# Patient Record
Sex: FEMALE | Race: WHITE | Employment: FULL TIME
[De-identification: names, ages, dates, MRNs, and addresses within clinical notes are randomized per-mention and may not be internally consistent; named-entity substitution may affect disease eponyms.]

---

## 2017-06-03 ENCOUNTER — HEALTH MAINTENANCE LETTER (OUTPATIENT)
Age: 57
End: 2017-06-03

## 2018-10-29 RX ORDER — MULTIPLE VITAMINS W/ MINERALS TAB 9MG-400MCG
1 TAB ORAL DAILY
COMMUNITY

## 2018-11-03 ENCOUNTER — ANESTHESIA EVENT (OUTPATIENT)
Dept: SURGERY | Facility: CLINIC | Age: 58
DRG: 455 | End: 2018-11-03
Payer: COMMERCIAL

## 2018-11-05 ENCOUNTER — APPOINTMENT (OUTPATIENT)
Dept: GENERAL RADIOLOGY | Facility: CLINIC | Age: 58
DRG: 455 | End: 2018-11-05
Attending: ORTHOPAEDIC SURGERY
Payer: COMMERCIAL

## 2018-11-05 ENCOUNTER — ANESTHESIA (OUTPATIENT)
Dept: SURGERY | Facility: CLINIC | Age: 58
DRG: 455 | End: 2018-11-05
Payer: COMMERCIAL

## 2018-11-05 ENCOUNTER — HOSPITAL ENCOUNTER (INPATIENT)
Facility: CLINIC | Age: 58
LOS: 3 days | Discharge: HOME | DRG: 455 | End: 2018-11-08
Attending: ORTHOPAEDIC SURGERY | Admitting: ORTHOPAEDIC SURGERY
Payer: COMMERCIAL

## 2018-11-05 ENCOUNTER — SURGERY (OUTPATIENT)
Age: 58
End: 2018-11-05

## 2018-11-05 DIAGNOSIS — M48.062 SPINAL STENOSIS OF LUMBAR REGION WITH NEUROGENIC CLAUDICATION: Primary | ICD-10-CM

## 2018-11-05 PROBLEM — M43.10: Status: ACTIVE | Noted: 2018-11-05

## 2018-11-05 LAB
ABO + RH BLD: NORMAL
ABO + RH BLD: NORMAL
BLD GP AB SCN SERPL QL: NORMAL
BLOOD BANK CMNT PATIENT-IMP: NORMAL
DEPRECATED CALCIDIOL+CALCIFEROL SERPL-MC: 76 UG/L (ref 20–75)
SODIUM SERPL-SCNC: 137 MMOL/L (ref 133–144)
SPECIMEN EXP DATE BLD: NORMAL

## 2018-11-05 PROCEDURE — 86850 RBC ANTIBODY SCREEN: CPT | Performed by: ANESTHESIOLOGY

## 2018-11-05 PROCEDURE — 27211024 ZZHC OR SUPPLY OTHER OPNP: Performed by: ORTHOPAEDIC SURGERY

## 2018-11-05 PROCEDURE — 84295 ASSAY OF SERUM SODIUM: CPT | Performed by: ORTHOPAEDIC SURGERY

## 2018-11-05 PROCEDURE — 25000128 H RX IP 250 OP 636: Performed by: PHYSICIAN ASSISTANT

## 2018-11-05 PROCEDURE — C1713 ANCHOR/SCREW BN/BN,TIS/BN: HCPCS | Performed by: ORTHOPAEDIC SURGERY

## 2018-11-05 PROCEDURE — 0SG0071 FUSION OF LUMBAR VERTEBRAL JOINT WITH AUTOLOGOUS TISSUE SUBSTITUTE, POSTERIOR APPROACH, POSTERIOR COLUMN, OPEN APPROACH: ICD-10-PCS | Performed by: ORTHOPAEDIC SURGERY

## 2018-11-05 PROCEDURE — 40000985 XR LUMBAR SPINE PORT 1 VW

## 2018-11-05 PROCEDURE — 25000128 H RX IP 250 OP 636: Performed by: ORTHOPAEDIC SURGERY

## 2018-11-05 PROCEDURE — 25000128 H RX IP 250 OP 636: Performed by: NURSE ANESTHETIST, CERTIFIED REGISTERED

## 2018-11-05 PROCEDURE — 25000128 H RX IP 250 OP 636: Performed by: ANESTHESIOLOGY

## 2018-11-05 PROCEDURE — 25000125 ZZHC RX 250: Performed by: NURSE ANESTHETIST, CERTIFIED REGISTERED

## 2018-11-05 PROCEDURE — 40000985 XR LUMBAR SPINE PORT 2-3VW

## 2018-11-05 PROCEDURE — P9041 ALBUMIN (HUMAN),5%, 50ML: HCPCS | Performed by: NURSE ANESTHETIST, CERTIFIED REGISTERED

## 2018-11-05 PROCEDURE — 36415 COLL VENOUS BLD VENIPUNCTURE: CPT | Performed by: ORTHOPAEDIC SURGERY

## 2018-11-05 PROCEDURE — 27810325 ZZHC OR IMPLANT OTHER OPNP: Performed by: ORTHOPAEDIC SURGERY

## 2018-11-05 PROCEDURE — 37000009 ZZH ANESTHESIA TECHNICAL FEE, EACH ADDTL 15 MIN: Performed by: ORTHOPAEDIC SURGERY

## 2018-11-05 PROCEDURE — 25000301 ZZH OR RX SURGIFLO W/THROMBIN KIT 2ML 1991 OPNP: Performed by: ORTHOPAEDIC SURGERY

## 2018-11-05 PROCEDURE — 27210794 ZZH OR GENERAL SUPPLY STERILE: Performed by: ORTHOPAEDIC SURGERY

## 2018-11-05 PROCEDURE — 12000000 ZZH R&B MED SURG/OB

## 2018-11-05 PROCEDURE — 37000008 ZZH ANESTHESIA TECHNICAL FEE, 1ST 30 MIN: Performed by: ORTHOPAEDIC SURGERY

## 2018-11-05 PROCEDURE — 0SG00AJ FUSION OF LUMBAR VERTEBRAL JOINT WITH INTERBODY FUSION DEVICE, POSTERIOR APPROACH, ANTERIOR COLUMN, OPEN APPROACH: ICD-10-PCS | Performed by: ORTHOPAEDIC SURGERY

## 2018-11-05 PROCEDURE — 71000013 ZZH RECOVERY PHASE 1 LEVEL 1 EA ADDTL HR: Performed by: ORTHOPAEDIC SURGERY

## 2018-11-05 PROCEDURE — 25000125 ZZHC RX 250: Performed by: ANESTHESIOLOGY

## 2018-11-05 PROCEDURE — 82306 VITAMIN D 25 HYDROXY: CPT | Performed by: ORTHOPAEDIC SURGERY

## 2018-11-05 PROCEDURE — 25000132 ZZH RX MED GY IP 250 OP 250 PS 637: Performed by: PHYSICIAN ASSISTANT

## 2018-11-05 PROCEDURE — 36000069 ZZH SURGERY LEVEL 5 EA 15 ADDTL MIN: Performed by: ORTHOPAEDIC SURGERY

## 2018-11-05 PROCEDURE — 36000071 ZZH SURGERY LEVEL 5 W FLUORO 1ST 30 MIN: Performed by: ORTHOPAEDIC SURGERY

## 2018-11-05 PROCEDURE — 25000566 ZZH SEVOFLURANE, EA 15 MIN: Performed by: ORTHOPAEDIC SURGERY

## 2018-11-05 PROCEDURE — 86900 BLOOD TYPING SEROLOGIC ABO: CPT | Performed by: ANESTHESIOLOGY

## 2018-11-05 PROCEDURE — 86901 BLOOD TYPING SEROLOGIC RH(D): CPT | Performed by: ANESTHESIOLOGY

## 2018-11-05 PROCEDURE — 0ST20ZZ RESECTION OF LUMBAR VERTEBRAL DISC, OPEN APPROACH: ICD-10-PCS | Performed by: ORTHOPAEDIC SURGERY

## 2018-11-05 PROCEDURE — 25000125 ZZHC RX 250: Performed by: ORTHOPAEDIC SURGERY

## 2018-11-05 PROCEDURE — 36415 COLL VENOUS BLD VENIPUNCTURE: CPT | Performed by: ANESTHESIOLOGY

## 2018-11-05 PROCEDURE — 27810322 ZZHC OR SPINE - CAGE/SPACER/DISK/CORD/CONNECTOR OPNP: Performed by: ORTHOPAEDIC SURGERY

## 2018-11-05 PROCEDURE — 25000132 ZZH RX MED GY IP 250 OP 250 PS 637: Performed by: ANESTHESIOLOGY

## 2018-11-05 PROCEDURE — 40000170 ZZH STATISTIC PRE-PROCEDURE ASSESSMENT II: Performed by: ORTHOPAEDIC SURGERY

## 2018-11-05 PROCEDURE — 71000012 ZZH RECOVERY PHASE 1 LEVEL 1 FIRST HR: Performed by: ORTHOPAEDIC SURGERY

## 2018-11-05 DEVICE — IMP SCR NUT PEDICLE 8X8 LOCKING: Type: IMPLANTABLE DEVICE | Site: SPINE LUMBAR | Status: FUNCTIONAL

## 2018-11-05 DEVICE — IMPLANTABLE DEVICE: Type: IMPLANTABLE DEVICE | Site: SPINE LUMBAR | Status: FUNCTIONAL

## 2018-11-05 DEVICE — IMP SCR SYN POLY 6.5X50MM: Type: IMPLANTABLE DEVICE | Site: SPINE LUMBAR | Status: FUNCTIONAL

## 2018-11-05 RX ORDER — ONDANSETRON 4 MG/1
4 TABLET, ORALLY DISINTEGRATING ORAL EVERY 6 HOURS PRN
Status: DISCONTINUED | OUTPATIENT
Start: 2018-11-05 | End: 2018-11-08 | Stop reason: HOSPADM

## 2018-11-05 RX ORDER — GABAPENTIN 300 MG/1
300 CAPSULE ORAL ONCE
Status: COMPLETED | OUTPATIENT
Start: 2018-11-05 | End: 2018-11-05

## 2018-11-05 RX ORDER — MULTIPLE VITAMINS W/ MINERALS TAB 9MG-400MCG
1 TAB ORAL DAILY
Status: DISCONTINUED | OUTPATIENT
Start: 2018-11-06 | End: 2018-11-08 | Stop reason: HOSPADM

## 2018-11-05 RX ORDER — HYDROMORPHONE HYDROCHLORIDE 1 MG/ML
.3-.5 INJECTION, SOLUTION INTRAMUSCULAR; INTRAVENOUS; SUBCUTANEOUS
Status: DISCONTINUED | OUTPATIENT
Start: 2018-11-05 | End: 2018-11-08 | Stop reason: HOSPADM

## 2018-11-05 RX ORDER — NEOSTIGMINE METHYLSULFATE 1 MG/ML
VIAL (ML) INJECTION PRN
Status: DISCONTINUED | OUTPATIENT
Start: 2018-11-05 | End: 2018-11-05

## 2018-11-05 RX ORDER — HYDROXYZINE HYDROCHLORIDE 50 MG/ML
25 INJECTION, SOLUTION INTRAMUSCULAR ONCE
Status: COMPLETED | OUTPATIENT
Start: 2018-11-05 | End: 2018-11-05

## 2018-11-05 RX ORDER — FENTANYL CITRATE 50 UG/ML
INJECTION, SOLUTION INTRAMUSCULAR; INTRAVENOUS PRN
Status: DISCONTINUED | OUTPATIENT
Start: 2018-11-05 | End: 2018-11-05

## 2018-11-05 RX ORDER — SODIUM CHLORIDE, SODIUM LACTATE, POTASSIUM CHLORIDE, CALCIUM CHLORIDE 600; 310; 30; 20 MG/100ML; MG/100ML; MG/100ML; MG/100ML
INJECTION, SOLUTION INTRAVENOUS CONTINUOUS
Status: DISCONTINUED | OUTPATIENT
Start: 2018-11-05 | End: 2018-11-05 | Stop reason: HOSPADM

## 2018-11-05 RX ORDER — ONDANSETRON 2 MG/ML
4 INJECTION INTRAMUSCULAR; INTRAVENOUS EVERY 30 MIN PRN
Status: DISCONTINUED | OUTPATIENT
Start: 2018-11-05 | End: 2018-11-05 | Stop reason: HOSPADM

## 2018-11-05 RX ORDER — METOCLOPRAMIDE 10 MG/1
10 TABLET ORAL EVERY 6 HOURS PRN
Status: DISCONTINUED | OUTPATIENT
Start: 2018-11-05 | End: 2018-11-08 | Stop reason: HOSPADM

## 2018-11-05 RX ORDER — GLYCOPYRROLATE 0.2 MG/ML
INJECTION, SOLUTION INTRAMUSCULAR; INTRAVENOUS PRN
Status: DISCONTINUED | OUTPATIENT
Start: 2018-11-05 | End: 2018-11-05

## 2018-11-05 RX ORDER — ONDANSETRON 2 MG/ML
INJECTION INTRAMUSCULAR; INTRAVENOUS PRN
Status: DISCONTINUED | OUTPATIENT
Start: 2018-11-05 | End: 2018-11-05

## 2018-11-05 RX ORDER — LIDOCAINE HYDROCHLORIDE 20 MG/ML
INJECTION, SOLUTION INFILTRATION; PERINEURAL PRN
Status: DISCONTINUED | OUTPATIENT
Start: 2018-11-05 | End: 2018-11-05

## 2018-11-05 RX ORDER — SODIUM CHLORIDE, SODIUM LACTATE, POTASSIUM CHLORIDE, CALCIUM CHLORIDE 600; 310; 30; 20 MG/100ML; MG/100ML; MG/100ML; MG/100ML
INJECTION, SOLUTION INTRAVENOUS CONTINUOUS PRN
Status: DISCONTINUED | OUTPATIENT
Start: 2018-11-05 | End: 2018-11-05

## 2018-11-05 RX ORDER — HYDROMORPHONE HYDROCHLORIDE 1 MG/ML
.3-.5 INJECTION, SOLUTION INTRAMUSCULAR; INTRAVENOUS; SUBCUTANEOUS EVERY 5 MIN PRN
Status: DISCONTINUED | OUTPATIENT
Start: 2018-11-05 | End: 2018-11-05 | Stop reason: HOSPADM

## 2018-11-05 RX ORDER — OXYCODONE HCL 10 MG/1
10 TABLET, FILM COATED, EXTENDED RELEASE ORAL ONCE
Status: COMPLETED | OUTPATIENT
Start: 2018-11-05 | End: 2018-11-05

## 2018-11-05 RX ORDER — OXYCODONE AND ACETAMINOPHEN 5; 325 MG/1; MG/1
1-2 TABLET ORAL EVERY 4 HOURS PRN
Status: DISCONTINUED | OUTPATIENT
Start: 2018-11-05 | End: 2018-11-08 | Stop reason: HOSPADM

## 2018-11-05 RX ORDER — AMOXICILLIN 250 MG
2 CAPSULE ORAL 2 TIMES DAILY
Status: DISCONTINUED | OUTPATIENT
Start: 2018-11-05 | End: 2018-11-08 | Stop reason: HOSPADM

## 2018-11-05 RX ORDER — METOCLOPRAMIDE HYDROCHLORIDE 5 MG/ML
10 INJECTION INTRAMUSCULAR; INTRAVENOUS EVERY 6 HOURS PRN
Status: DISCONTINUED | OUTPATIENT
Start: 2018-11-05 | End: 2018-11-08 | Stop reason: HOSPADM

## 2018-11-05 RX ORDER — EPHEDRINE SULFATE 50 MG/ML
INJECTION, SOLUTION INTRAMUSCULAR; INTRAVENOUS; SUBCUTANEOUS PRN
Status: DISCONTINUED | OUTPATIENT
Start: 2018-11-05 | End: 2018-11-05

## 2018-11-05 RX ORDER — ALBUMIN, HUMAN INJ 5% 5 %
SOLUTION INTRAVENOUS CONTINUOUS PRN
Status: DISCONTINUED | OUTPATIENT
Start: 2018-11-05 | End: 2018-11-05

## 2018-11-05 RX ORDER — NALOXONE HYDROCHLORIDE 0.4 MG/ML
.1-.4 INJECTION, SOLUTION INTRAMUSCULAR; INTRAVENOUS; SUBCUTANEOUS
Status: ACTIVE | OUTPATIENT
Start: 2018-11-05 | End: 2018-11-06

## 2018-11-05 RX ORDER — LORAZEPAM 2 MG/ML
.5-1 INJECTION INTRAMUSCULAR EVERY 6 HOURS PRN
Status: DISCONTINUED | OUTPATIENT
Start: 2018-11-05 | End: 2018-11-08 | Stop reason: HOSPADM

## 2018-11-05 RX ORDER — AMOXICILLIN 250 MG
1 CAPSULE ORAL 2 TIMES DAILY
Status: DISCONTINUED | OUTPATIENT
Start: 2018-11-05 | End: 2018-11-08 | Stop reason: HOSPADM

## 2018-11-05 RX ORDER — ATORVASTATIN CALCIUM 10 MG/1
10 TABLET, FILM COATED ORAL AT BEDTIME
Status: DISCONTINUED | OUTPATIENT
Start: 2018-11-05 | End: 2018-11-08 | Stop reason: HOSPADM

## 2018-11-05 RX ORDER — HYDRALAZINE HYDROCHLORIDE 20 MG/ML
2.5-5 INJECTION INTRAMUSCULAR; INTRAVENOUS EVERY 10 MIN PRN
Status: DISCONTINUED | OUTPATIENT
Start: 2018-11-05 | End: 2018-11-05 | Stop reason: HOSPADM

## 2018-11-05 RX ORDER — CEFAZOLIN SODIUM 1 G/3ML
1 INJECTION, POWDER, FOR SOLUTION INTRAMUSCULAR; INTRAVENOUS SEE ADMIN INSTRUCTIONS
Status: DISCONTINUED | OUTPATIENT
Start: 2018-11-05 | End: 2018-11-05 | Stop reason: HOSPADM

## 2018-11-05 RX ORDER — LIDOCAINE 40 MG/G
CREAM TOPICAL
Status: DISCONTINUED | OUTPATIENT
Start: 2018-11-05 | End: 2018-11-08 | Stop reason: HOSPADM

## 2018-11-05 RX ORDER — BISACODYL 10 MG
10 SUPPOSITORY, RECTAL RECTAL DAILY PRN
Status: DISCONTINUED | OUTPATIENT
Start: 2018-11-05 | End: 2018-11-08 | Stop reason: HOSPADM

## 2018-11-05 RX ORDER — ONDANSETRON 4 MG/1
4 TABLET, ORALLY DISINTEGRATING ORAL EVERY 30 MIN PRN
Status: DISCONTINUED | OUTPATIENT
Start: 2018-11-05 | End: 2018-11-05 | Stop reason: HOSPADM

## 2018-11-05 RX ORDER — ALBUTEROL SULFATE 0.83 MG/ML
2.5 SOLUTION RESPIRATORY (INHALATION) EVERY 4 HOURS PRN
Status: DISCONTINUED | OUTPATIENT
Start: 2018-11-05 | End: 2018-11-05 | Stop reason: HOSPADM

## 2018-11-05 RX ORDER — ONDANSETRON 2 MG/ML
4 INJECTION INTRAMUSCULAR; INTRAVENOUS EVERY 6 HOURS PRN
Status: DISCONTINUED | OUTPATIENT
Start: 2018-11-05 | End: 2018-11-08 | Stop reason: HOSPADM

## 2018-11-05 RX ORDER — LORAZEPAM 0.5 MG/1
.5-1 TABLET ORAL EVERY 6 HOURS PRN
Status: DISCONTINUED | OUTPATIENT
Start: 2018-11-05 | End: 2018-11-08 | Stop reason: HOSPADM

## 2018-11-05 RX ORDER — BUPIVACAINE HYDROCHLORIDE AND EPINEPHRINE 2.5; 5 MG/ML; UG/ML
INJECTION, SOLUTION EPIDURAL; INFILTRATION; INTRACAUDAL; PERINEURAL PRN
Status: DISCONTINUED | OUTPATIENT
Start: 2018-11-05 | End: 2018-11-05 | Stop reason: HOSPADM

## 2018-11-05 RX ORDER — ACETAMINOPHEN 500 MG
1000 TABLET ORAL 3 TIMES DAILY PRN
Status: DISCONTINUED | OUTPATIENT
Start: 2018-11-05 | End: 2018-11-08 | Stop reason: HOSPADM

## 2018-11-05 RX ORDER — FENTANYL CITRATE 50 UG/ML
25-50 INJECTION, SOLUTION INTRAMUSCULAR; INTRAVENOUS
Status: DISCONTINUED | OUTPATIENT
Start: 2018-11-05 | End: 2018-11-05 | Stop reason: HOSPADM

## 2018-11-05 RX ORDER — ACETAMINOPHEN 500 MG
1000 TABLET ORAL ONCE
Status: COMPLETED | OUTPATIENT
Start: 2018-11-05 | End: 2018-11-05

## 2018-11-05 RX ORDER — PROPOFOL 10 MG/ML
INJECTION, EMULSION INTRAVENOUS CONTINUOUS PRN
Status: DISCONTINUED | OUTPATIENT
Start: 2018-11-05 | End: 2018-11-05

## 2018-11-05 RX ORDER — CEFAZOLIN SODIUM 2 G/100ML
2 INJECTION, SOLUTION INTRAVENOUS
Status: COMPLETED | OUTPATIENT
Start: 2018-11-05 | End: 2018-11-05

## 2018-11-05 RX ORDER — SODIUM CHLORIDE AND POTASSIUM CHLORIDE 150; 900 MG/100ML; MG/100ML
INJECTION, SOLUTION INTRAVENOUS CONTINUOUS
Status: DISCONTINUED | OUTPATIENT
Start: 2018-11-05 | End: 2018-11-08 | Stop reason: HOSPADM

## 2018-11-05 RX ORDER — NALOXONE HYDROCHLORIDE 0.4 MG/ML
.1-.4 INJECTION, SOLUTION INTRAMUSCULAR; INTRAVENOUS; SUBCUTANEOUS
Status: DISCONTINUED | OUTPATIENT
Start: 2018-11-05 | End: 2018-11-08 | Stop reason: HOSPADM

## 2018-11-05 RX ORDER — CEFAZOLIN SODIUM 1 G/3ML
1 INJECTION, POWDER, FOR SOLUTION INTRAMUSCULAR; INTRAVENOUS EVERY 8 HOURS
Status: COMPLETED | OUTPATIENT
Start: 2018-11-05 | End: 2018-11-06

## 2018-11-05 RX ORDER — PROPOFOL 10 MG/ML
INJECTION, EMULSION INTRAVENOUS PRN
Status: DISCONTINUED | OUTPATIENT
Start: 2018-11-05 | End: 2018-11-05

## 2018-11-05 RX ADMIN — PHENYLEPHRINE HYDROCHLORIDE 50 MCG: 10 INJECTION, SOLUTION INTRAMUSCULAR; INTRAVENOUS; SUBCUTANEOUS at 09:23

## 2018-11-05 RX ADMIN — PHENYLEPHRINE HYDROCHLORIDE 50 MCG: 10 INJECTION, SOLUTION INTRAMUSCULAR; INTRAVENOUS; SUBCUTANEOUS at 08:19

## 2018-11-05 RX ADMIN — FENTANYL CITRATE 50 MCG: 50 INJECTION, SOLUTION INTRAMUSCULAR; INTRAVENOUS at 07:40

## 2018-11-05 RX ADMIN — POTASSIUM CHLORIDE AND SODIUM CHLORIDE: 900; 150 INJECTION, SOLUTION INTRAVENOUS at 16:55

## 2018-11-05 RX ADMIN — OXYCODONE HYDROCHLORIDE 10 MG: 10 TABLET, FILM COATED, EXTENDED RELEASE ORAL at 07:02

## 2018-11-05 RX ADMIN — FENTANYL CITRATE 50 MCG: 50 INJECTION, SOLUTION INTRAMUSCULAR; INTRAVENOUS at 07:39

## 2018-11-05 RX ADMIN — ROCURONIUM BROMIDE 30 MG: 10 INJECTION INTRAVENOUS at 08:19

## 2018-11-05 RX ADMIN — OXYCODONE HYDROCHLORIDE AND ACETAMINOPHEN 2 TABLET: 5; 325 TABLET ORAL at 21:01

## 2018-11-05 RX ADMIN — OXYCODONE HYDROCHLORIDE AND ACETAMINOPHEN 1 TABLET: 5; 325 TABLET ORAL at 16:12

## 2018-11-05 RX ADMIN — PROPOFOL 200 MG: 10 INJECTION, EMULSION INTRAVENOUS at 07:41

## 2018-11-05 RX ADMIN — SODIUM CHLORIDE, POTASSIUM CHLORIDE, SODIUM LACTATE AND CALCIUM CHLORIDE: 600; 310; 30; 20 INJECTION, SOLUTION INTRAVENOUS at 07:15

## 2018-11-05 RX ADMIN — SODIUM CHLORIDE, POTASSIUM CHLORIDE, SODIUM LACTATE AND CALCIUM CHLORIDE: 600; 310; 30; 20 INJECTION, SOLUTION INTRAVENOUS at 06:30

## 2018-11-05 RX ADMIN — FENTANYL CITRATE 50 MCG: 50 INJECTION, SOLUTION INTRAMUSCULAR; INTRAVENOUS at 08:19

## 2018-11-05 RX ADMIN — ACETAMINOPHEN 1000 MG: 500 TABLET, FILM COATED ORAL at 07:02

## 2018-11-05 RX ADMIN — CEFAZOLIN 1 G: 1 INJECTION, POWDER, FOR SOLUTION INTRAMUSCULAR; INTRAVENOUS at 20:09

## 2018-11-05 RX ADMIN — ATORVASTATIN CALCIUM 10 MG: 10 TABLET, FILM COATED ORAL at 21:01

## 2018-11-05 RX ADMIN — Medication 0.5 MG: at 12:11

## 2018-11-05 RX ADMIN — Medication 0.5 MG: at 12:37

## 2018-11-05 RX ADMIN — PHENYLEPHRINE HYDROCHLORIDE 50 MCG: 10 INJECTION, SOLUTION INTRAMUSCULAR; INTRAVENOUS; SUBCUTANEOUS at 08:31

## 2018-11-05 RX ADMIN — ALBUMIN (HUMAN): 12.5 SOLUTION INTRAVENOUS at 09:54

## 2018-11-05 RX ADMIN — DEXMEDETOMIDINE HYDROCHLORIDE 0.2 MCG/KG/HR: 100 INJECTION, SOLUTION INTRAVENOUS at 10:03

## 2018-11-05 RX ADMIN — FENTANYL CITRATE 25 MCG: 50 INJECTION, SOLUTION INTRAMUSCULAR; INTRAVENOUS at 11:27

## 2018-11-05 RX ADMIN — LORAZEPAM 0.5 MG: 2 INJECTION INTRAMUSCULAR; INTRAVENOUS at 19:00

## 2018-11-05 RX ADMIN — ROCURONIUM BROMIDE 10 MG: 10 INJECTION INTRAVENOUS at 09:18

## 2018-11-05 RX ADMIN — BUPIVACAINE HYDROCHLORIDE AND EPINEPHRINE BITARTRATE 27 ML: 2.5; .0091 INJECTION, SOLUTION EPIDURAL; INFILTRATION; INTRACAUDAL; PERINEURAL at 11:34

## 2018-11-05 RX ADMIN — Medication 0.5 MG: at 13:31

## 2018-11-05 RX ADMIN — NEOSTIGMINE METHYLSULFATE 3 MG: 1 INJECTION, SOLUTION INTRAVENOUS at 11:27

## 2018-11-05 RX ADMIN — GLYCOPYRROLATE 0.4 MG: 0.2 INJECTION, SOLUTION INTRAMUSCULAR; INTRAVENOUS at 11:27

## 2018-11-05 RX ADMIN — ROCURONIUM BROMIDE 20 MG: 10 INJECTION INTRAVENOUS at 09:37

## 2018-11-05 RX ADMIN — Medication 0.5 MG: at 13:11

## 2018-11-05 RX ADMIN — PHENYLEPHRINE HYDROCHLORIDE 100 MCG: 10 INJECTION, SOLUTION INTRAMUSCULAR; INTRAVENOUS; SUBCUTANEOUS at 11:39

## 2018-11-05 RX ADMIN — SODIUM CHLORIDE, POTASSIUM CHLORIDE, SODIUM LACTATE AND CALCIUM CHLORIDE: 600; 310; 30; 20 INJECTION, SOLUTION INTRAVENOUS at 08:10

## 2018-11-05 RX ADMIN — ROCURONIUM BROMIDE 50 MG: 10 INJECTION INTRAVENOUS at 07:42

## 2018-11-05 RX ADMIN — SODIUM CHLORIDE, POTASSIUM CHLORIDE, SODIUM LACTATE AND CALCIUM CHLORIDE: 600; 310; 30; 20 INJECTION, SOLUTION INTRAVENOUS at 13:58

## 2018-11-05 RX ADMIN — LIDOCAINE HYDROCHLORIDE 100 MG: 20 INJECTION, SOLUTION INFILTRATION; PERINEURAL at 07:40

## 2018-11-05 RX ADMIN — CEFAZOLIN SODIUM 2 G: 2 INJECTION, SOLUTION INTRAVENOUS at 07:31

## 2018-11-05 RX ADMIN — ROCURONIUM BROMIDE 10 MG: 10 INJECTION INTRAVENOUS at 10:34

## 2018-11-05 RX ADMIN — MIDAZOLAM 2 MG: 1 INJECTION INTRAMUSCULAR; INTRAVENOUS at 07:24

## 2018-11-05 RX ADMIN — PHENYLEPHRINE HYDROCHLORIDE 50 MCG: 10 INJECTION, SOLUTION INTRAMUSCULAR; INTRAVENOUS; SUBCUTANEOUS at 08:40

## 2018-11-05 RX ADMIN — ONDANSETRON 4 MG: 2 INJECTION INTRAMUSCULAR; INTRAVENOUS at 11:17

## 2018-11-05 RX ADMIN — SODIUM CHLORIDE, POTASSIUM CHLORIDE, SODIUM LACTATE AND CALCIUM CHLORIDE: 600; 310; 30; 20 INJECTION, SOLUTION INTRAVENOUS at 06:20

## 2018-11-05 RX ADMIN — THROMBIN, TOPICAL (BOVINE) 5000 UNITS: KIT at 09:56

## 2018-11-05 RX ADMIN — CEFAZOLIN 1 G: 1 INJECTION, POWDER, FOR SOLUTION INTRAMUSCULAR; INTRAVENOUS at 11:31

## 2018-11-05 RX ADMIN — OXYCODONE HYDROCHLORIDE AND ACETAMINOPHEN 1 TABLET: 5; 325 TABLET ORAL at 17:00

## 2018-11-05 RX ADMIN — LIDOCAINE HYDROCHLORIDE 1 ML: 10 INJECTION, SOLUTION EPIDURAL; INFILTRATION; INTRACAUDAL; PERINEURAL at 06:30

## 2018-11-05 RX ADMIN — PHENYLEPHRINE HYDROCHLORIDE 0.25 MCG/KG/MIN: 10 INJECTION, SOLUTION INTRAMUSCULAR; INTRAVENOUS; SUBCUTANEOUS at 08:52

## 2018-11-05 RX ADMIN — GENTAMICIN SULFATE 1000 ML: 40 INJECTION, SOLUTION INTRAMUSCULAR; INTRAVENOUS at 09:56

## 2018-11-05 RX ADMIN — HYDROXYZINE HYDROCHLORIDE 25 MG: 50 INJECTION, SOLUTION INTRAMUSCULAR at 13:20

## 2018-11-05 RX ADMIN — PHENYLEPHRINE HYDROCHLORIDE 100 MCG: 10 INJECTION, SOLUTION INTRAMUSCULAR; INTRAVENOUS; SUBCUTANEOUS at 08:49

## 2018-11-05 RX ADMIN — ROCURONIUM BROMIDE 20 MG: 10 INJECTION INTRAVENOUS at 08:49

## 2018-11-05 RX ADMIN — ONDANSETRON 4 MG: 2 INJECTION INTRAMUSCULAR; INTRAVENOUS at 16:19

## 2018-11-05 RX ADMIN — FENTANYL CITRATE 50 MCG: 50 INJECTION, SOLUTION INTRAMUSCULAR; INTRAVENOUS at 12:10

## 2018-11-05 RX ADMIN — ROCURONIUM BROMIDE 10 MG: 10 INJECTION INTRAVENOUS at 10:08

## 2018-11-05 RX ADMIN — SODIUM CHLORIDE, POTASSIUM CHLORIDE, SODIUM LACTATE AND CALCIUM CHLORIDE: 600; 310; 30; 20 INJECTION, SOLUTION INTRAVENOUS at 08:30

## 2018-11-05 RX ADMIN — SENNOSIDES AND DOCUSATE SODIUM 2 TABLET: 8.6; 5 TABLET ORAL at 20:10

## 2018-11-05 RX ADMIN — Medication 10 MG: at 08:51

## 2018-11-05 RX ADMIN — FENTANYL CITRATE 50 MCG: 50 INJECTION, SOLUTION INTRAMUSCULAR; INTRAVENOUS at 12:26

## 2018-11-05 RX ADMIN — GABAPENTIN 300 MG: 300 CAPSULE ORAL at 07:02

## 2018-11-05 RX ADMIN — PROPOFOL 25 MCG/KG/MIN: 10 INJECTION, EMULSION INTRAVENOUS at 08:15

## 2018-11-05 ASSESSMENT — LIFESTYLE VARIABLES: TOBACCO_USE: 0

## 2018-11-05 ASSESSMENT — ACTIVITIES OF DAILY LIVING (ADL)
ADLS_ACUITY_SCORE: 9
ADLS_ACUITY_SCORE: 9

## 2018-11-05 ASSESSMENT — ENCOUNTER SYMPTOMS
SEIZURES: 0
DYSRHYTHMIAS: 0

## 2018-11-05 ASSESSMENT — COPD QUESTIONNAIRES: COPD: 0

## 2018-11-05 NOTE — ANESTHESIA POSTPROCEDURE EVALUATION
Patient: Monika Sharp    Procedure(s):  L 4 LAMINECTOMIES L5 HEMILAMINECTOMY ; L4-5 DECOMPRESSION POSTERIOR SPINAL FUSION AND INTERBODY FUSION USING INTRAVERTEBRAL PROSTHESIS ; LOCAL AUTOGRAFT AND INSTRUMENTATION    Diagnosis:DEGENERATIVE SPINAL STENOSIS ; L 4-5 SPONDYLOLYTHESIS ; NEUROGENIC CLAUDICATION   Diagnosis Additional Information: No value filed.    Anesthesia Type:  General, ETT    Note:  Anesthesia Post Evaluation    Patient location during evaluation: Phase 2  Patient participation: Able to fully participate in evaluation  Level of consciousness: awake and alert  Pain management: adequate  Airway patency: patent  Cardiovascular status: acceptable  Respiratory status: acceptable  Hydration status: acceptable  PONV: none     Anesthetic complications: None          Last vitals:  Vitals:    11/05/18 1330 11/05/18 1340 11/05/18 1350   BP: 115/63 123/66 127/65   Pulse:      Resp: 25 11 11   Temp:      SpO2: (!) 0% 97% 96%         Electronically Signed By: Sena Dumont MD  November 5, 2018  1:58 PM

## 2018-11-05 NOTE — IP AVS SNAPSHOT
[facility]    [address]    [place] [address]    Phone:  [phone]                                       After Visit Summary   11/5/2018    Monika Sharp    MRN: [id number]           After Visit Summary Signature Page     I have received my discharge instructions, and my questions have been answered. I have discussed any challenges I see with this plan with the nurse or doctor.    ..........................................................................................................................................  Patient/Patient Representative Signature      ..........................................................................................................................................  Patient Representative Print Name and Relationship to Patient    ..................................................               ................................................  Date                                   Time    ..........................................................................................................................................  Reviewed by Signature/Title    ...................................................              ..............................................  Date                                               Time          22EPIC Rev 08/18

## 2018-11-05 NOTE — ANESTHESIA PREPROCEDURE EVALUATION
Procedure: Procedure(s):  L 4-5 LAMINECTOMIES ; L4-5 POSTERIOR SPINAL FUSION AND INTERBODY FUSION USING INTRAVERTEBRAL PROSTHESIS ; LOCAL AUTOGRAFT AND INSTRUMENTATION ( CHRISTI TABLE ; MIDAS AM8 ; CHIQUI INTERVERTEBRAL PROTHESIS,  INSTRUMENTATION)  Preop diagnosis: DEGENERATIVE SPINAL STENOSIS ; L 4-5 SPONDYLOLYTHESIS ; NEUROGENIC CLAUDICATION     No Known Allergies  Past Medical History:   Diagnosis Date     Chronic back pain      Spondylolisthesis of lumbar region      Spondylolisthesis, cervical region      Spondylosis      Past Surgical History:   Procedure Laterality Date     ABDOMEN SURGERY      c.section x 1     BUNIONECTOMY       BUNIONECTOMY  12/18/2013    Procedure: BUNIONECTOMY;  LEFT FOOT BUNIONECTOMY;  Surgeon: Jasiel Melgar DPM;  [facility]: [facility]     BUNIONECTOMY LUCIO      right     LUMPECTOMY BREAST      right     US BREAST BIOPSY VAC ASSISTED RT Right      VASCULAR SURGERY      varicose vein ligation and stripping     wisdom teeth[       Prior to Admission medications    Medication Sig Start Date End Date Taking? Authorizing Provider   Acetaminophen (TYLENOL PO)    Yes Reported, Patient   ATORVASTATIN CALCIUM PO Take 10 mg by mouth At Bedtime   Yes Reported, Patient   CALCIUM-MAGNESIUM-VITAMIN D PO Take 3 tablets by mouth daily   Yes Reported, Patient   Coenzyme Q10 (COQ10 PO) Take 100 mg by mouth daily   Yes Reported, Patient   Ibuprofen (ADVIL PO)    Yes Reported, Patient   multivitamin, therapeutic with minerals (MULTI-VITAMIN) TABS tablet Take 1 tablet by mouth daily [place] brand, +55 Formula.   Yes Reported, Patient   Omega-3 Fatty Acids (OMEGA 3 PO) Take 2 capsules by mouth daily (with GLA) Complete Omega brand.   Yes Reported, Patient   VITAMIN D, CHOLECALCIFEROL, PO Take 5,000 Units by mouth daily   Yes Reported, Patient   glucosamine-chondroitin 500-400 MG CAPS Take 1 capsule by mouth 3 times daily     Reported, Patient   oxyCODONE (ROXICODONE) 5 MG immediate release tablet Take  1-2 tablets (5-10 mg) by mouth every 4 hours as needed for other (Moderate to Severe Pain) 12/18/13   Jasiel Melgar DPM   Probiotic Product (PROBIOTIC DAILY PO) Take 1 tablet by mouth daily    Reported, Patient     Current Facility-Administered Medications Ordered in Epic   Medication Dose Route Frequency Last Rate Last Dose     ceFAZolin (ANCEF) 1 g vial to attach to  ml bag for ADULT or 50 ml bag for PEDS  1 g Intravenous See Admin Instructions         ceFAZolin (ANCEF) intermittent infusion 2 g in 100 mL dextrose PRE-MIX  2 g Intravenous Pre-Op/Pre-procedure x 1 dose         lactated ringers infusion   Intravenous Continuous 25 mL/hr at 11/05/18 0620       lidocaine 1 % 1 mL  1 mL Other Q1H PRN   1 mL at 11/05/18 0630     No current [facility]-ordered outpatient prescriptions on file.     Wt Readings from Last 1 Encounters:   12/18/13 69.4 kg (153 lb)     Temp Readings from Last 1 Encounters:   12/18/13 36.3  C (97.3  F) (Oral)     BP Readings from Last 6 Encounters:   12/18/13 102/61     Pulse Readings from Last 4 Encounters:   No data found for Pulse     Resp Readings from Last 1 Encounters:   12/18/13 16     SpO2 Readings from Last 1 Encounters:   12/18/13 100%        RECENT LABS:   ECG:   ECHO:   CXR:        Anesthesia Evaluation     . Pt has not had prior anesthetic            ROS/MED HX    ENT/Pulmonary:      (-) tobacco use, asthma, COPD and sleep apnea   Neurologic:     (+)other neuro spondylolisthesis   (-) seizures, CVA and migraines   Cardiovascular:     (+) Dyslipidemia, ----. : . . . :. .      (-) hypertension, CAD, [place], arrhythmias and valvular problems/murmurs   METS/Exercise Tolerance:  >4 METS   Hematologic:        (-) history of blood clots, anemia and other hematologic disorder   Musculoskeletal:        (-) arthritis   GI/Hepatic:        (-) GERD and liver disease   Renal/Genitourinary:      (-) renal disease and nephrolithiasis   Endo:      (-) Type I DM, Type II DM, thyroid disease and  obesity   Psychiatric:        (-) psychiatric history   Infectious Disease:        (-) Recent Fever   Malignancy:         Other:                     Physical Exam  Normal systems: cardiovascular, pulmonary and dental    Airway   Mallampati: II  TM distance: >3 FB  Neck ROM: full    Dental     Cardiovascular   Rhythm and rate: regular and normal  (-) no murmur    Pulmonary    breath sounds clear to auscultation                    Anesthesia Plan      History & Physical Review      ASA Status:  2 .    NPO Status:  > 8 hours    Plan for General and ETT with Propofol induction. Maintenance will be Balanced.    PONV prophylaxis:  Ondansetron (or other 5HT-3) and Dexamethasone or Solumedrol  Additional equipment: 2nd IV Background propofol and  precedex infusions  Phenylephrine infusion available        Postoperative Care  Postoperative pain management:  Multi-modal analgesia.      Consents  Anesthetic plan, risks, benefits and alternatives discussed with:  Patient..                          .

## 2018-11-05 NOTE — OR NURSING
Notified Dr Dumont, ELIANA, of pt c/o 9/10 pain no change with pain medication or reposition. MDA will be here to assess

## 2018-11-05 NOTE — IP AVS SNAPSHOT
` ` Patient Information     Patient Name Sex     Monika Sharp ([id number]) Female 1960       Room Bed    [address] [place] [address]      Patient Demographics     Address Phone E-mail Address    [address] [place] [address] [phone] (Home)  [phone] (Work)  [phone] (Mobile) [email or web address]      Patient Ethnicity & Race     Ethnic Group Patient Race    American White      Emergency Contact(s)     Name Relation Home Work Mobile    Balbir Sharp Spouse [phone]      Documents on File        Status Date Received Description       Documents for the Patient    Consent Form  05     Privacy Notice - [place]  05     Insurance Card  05     External Medication Information Consent       Patient ID Received 13     Consent for Services - Hospital/Clinic Received () 11     Insurance Card Received 11     Privacy Notice - [place] Received 11     Business/Insurance/Care Coordination/Health Form - Patient.1   [employer] Payment Agreement    Insurance Card Received 13     Consent for Services - Hospital/Clinic Received () 13     Consent for EHR Access  13 Copied from existing Consent for services - C/HOD collected on 2011    [facility] Specified Other       Consent for Services - Hospital and Clinic Received 18     HIE Auth Received 18     Insurance Card Received 18     Patient ID Received 18     CE Persistent Point of Care Auth Received 18 CE Persistent Point of Care Authorization       Documents for the Encounter    H/P - History and Physical  [facility] [place] [facility]   HISTORY AND PHYSICAL  10/17/2018    CMS IM for Patient Signature       Lab Result - HIM Scan  18 LAB RESULTS  [facility] [facility]  10/17/2018    EKG Cardiac - HIM Scan  18 EKG  [facility] [facility]   10/17/2018    CE Point of Care Auth Received        Admission  Information     Attending Provider Admitting Provider Admission Type Admission Date/Time    Abbe Viera MD Dick, Jeffrey Clayton, MD Elective 11/05/18  0542    Discharge Date Hospital Service Auth/Cert Status Service Area     Surgery Incomplete [facility]    Unit Room/Bed Admission Status        [facility] 0701/0701-01 Admission (Confirmed)       Admission     Complaint    DEGENERATIVE SPINAL STENOSIS ; L 4-5 SPONDYLOLYTHESIS ; NEUROGENIC CLAUDICATION , Spondylolisthesis, unspecified spinal region      Hospital Account     Name Acct ID Class Status Primary Coverage    Monika Sharp [id number] Inpatient Open [employer] [facility] [place] OPEN ACCESS            Guarantor Account (for Hospital Account #[id number])     Name Relation to Pt Service Area Active? Acct Type    Monika Sharp  FCS Yes Personal/Family    Address Phone          [address] [place] [address] [phone](H)  [phone](O)              Coverage Information (for Hospital Account #[id number])     F/O Payor/Plan Precert #    [place] OPEN ACCESS [id number]    Subscriber Subscriber #    [place] Sharp [id number]    Address Phone    [address]  [place] [address] [phone]

## 2018-11-05 NOTE — IP AVS SNAPSHOT
MRN:[id number]                      After Visit Summary   11/5/2018    Monika Sharp    MRN: [id number]           Thank you!     Thank you for choosing [facility] for your care. Our goal is always to provide you with excellent care. Hearing back from our patients is one way we can continue to improve our services. Please take a few minutes to complete the written survey that you may receive in the mail after you visit with us. Thank you!        Patient Information     Date Of Birth          1960        Designated Caregiver       Most Recent Value    Caregiver    Will someone help with your care after discharge? yes    Name of designated caregiver Balbir- spouse    Phone number of caregiver     Caregiver address [place]      About your hospital stay     You were admitted on:  November 5, 2018 You last received care in the:  [facility] [address] [facility]    You were discharged on:  November 8, 2018        Reason for your hospital stay       Review Discharge summary                  Who to Call     For medical emergencies, please call 911.  For non-urgent questions about your medical care, please call your primary care provider or clinic, [phone]  For questions related to your surgery, please call your surgery clinic        Attending Provider     Provider Specialty    Abbe Viera MD Orthopedics       Primary Care Provider Office Phone # Fax #    Monika Cuellar [phone]      After Care Instructions     Activity       Your activity upon discharge: Avoid twisting and bending and lifting.  Walk daily for exercise            Diet       Follow this diet upon discharge: Eat a regular diet high in protein and dietary calcium.            Wound care and dressings       Instructions to care for your wound at home: Keep wound dry for five days after surgery.                  Follow-up Appointments     Follow-up and recommended labs and tests         Follow-up with Dr. Viera in six weeks.                  Further instructions from your care team       INSTRUCTIONS FOR LUMBAR SURGICAL PATIENTS  Abbe Viera M.D.--[facility]    POST-OPERATIVE INSTRUCTIONS (AFTER SURGERY):    INCISION/WOUND CARE:                1.   If you are discharged from the hospital with a dressing over your incision you may remove and replace it 2 days after leaving the hospital.     2.   If you have sutures that dissolve, the incision must be covered when showering for 5 days after surgery. On the 6th day, you may take a shower normally without covering the wound. But do not scrub the wound.     3.  The small pieces of tape over your incision are called Steri-strips, they can be   removed when they are loose or after 2 weeks.                         4.    If you have staples, your incision must remain dry and covered until they are removed 2 weeks after your surgery. Please call Chel at [phone] to make an appointment to have these removed when you have returned home from the hospital.    5.   Please look at your incisions daily and call Chel at [phone] immediately if you notice any redness, swelling, drainage, or if you develop a fever greater than 101. If after hours or weekends call [phone] and speak to the on-call physician.    6.  Avoid wearing tight clothing over your incisions.    7. Absolutely no bathtubs, hot tubs, swimming in pools or lakes, or any submersion/soaking before the incision is completely healed (no open areas or scabs are present).    POST OPERATIVE ACTIVITY LIMITATIONS:    1. No lifting over 10 pounds (gallon of milk) above your chest or below your waist.     2. Avoid repetitive twisting or bending i.e. raking, sweeping, shoveling etc.    3. Walking stimulates the healing process. Dr. Viera wants you to accomplish a minimum of 45 minutes of sustained walking per day for exercise. You are encouraged to walk several times a  day and there is no limit on how far you can walk. In the beginning you may only be able to walk 5-15 minutes at a time that is okay just do this a minimum of 4-10x/day.  4. You may remove your white stockings (nita hose) for   hour twice a day. You may discontinue wearing the stockings when you are not spending any time in bed during the day and are walking at least hourly.    5. You should do the Adherent Nerve Root Stretch exercise 4-5 times a day; please see the attached instruction sheet for how to do these.    6. You may begin driving again when you are no longer taking the narcotic pain medication and feel comfortable with being able to navigate amongst other drivers.    7. You may return to work when you are no longer taking the narcotic pain medication. You must observe the 10-pound lifting restriction (nothing below waist or above chest), frequent change of position from sit, stand, and walking and avoid repetitive bending and twisting.    8. Advancement of physical activities will be discussed at each follow up appointment with Dr. Viera. Physical therapy, if needed, will also be discussed at each appointment.    9. Gentle sexual activity is okay. Be a passive participant.    POST OPERATIVE MEDICATIONS:    1. If your surgery INCLUDED A FUSION  DO NOT take Ibuprofen, Motrin, Advil, Aleve or any other anti-inflammatory medication or aspirin products for 3 months after surgery. This also includes any medication taken for chronic inflammatory conditions i.e. Methotrexate, Remicaid, Prednisone etc. unless otherwise instructed. Only Tylenol or Tylenol based medications are okay during this time frame.    2. If your surgery DID NOT include a fusion you may resume any over-the-counter     anti-inflammatories (Advil, Ibuprofen, Naproxen etc.) 3-5 days after the surgery.    3. We recommend 3-8 ounce glasses of milk daily and a daily supplement of Vitamin D 2000 i.u./day for fusion healing and bone growth.    4. Poor  "nutrition can lead to delayed wound healing and constipation. Good sources of lean proteins and fresh fruits and vegetables will help with this.     5. Narcotic pain medications will also cause constipation. Using over the counter stool softeners, drinking plenty of fluids, ambulation, and good nutrition can help prevent this from occurring.   If you have any questions regarding these instructions please contact   Chel at [phone].            Pending Results     No orders found from 11/3/2018 to 2018.            Statement of Approval     Ordered          18 1114  I have reviewed and agree with all the recommendations and orders detailed in this document.  EFFECTIVE NOW     Approved and electronically signed by:  Abbe Viera MD             Admission Information     Date & Time Provider Department Dept. Phone    2018 Abbe Viera MD [address] [facility] [phone]      Your Vitals Were     Blood Pressure Pulse Temperature Respirations Weight Pulse Oximetry    120/71 (BP Location: Left arm) 76 98.6  F (37  C) (Oral) 16 78.2 kg (172 lb 6.4 oz) 99%    BMI (Body Mass Index)                   27 kg/m2           MyChart Information     [facility] lets you send messages to your doctor, view your test results, renew your prescriptions, schedule appointments and more. To sign up, go to [email or web address] . Click on \"Log in\" on the left side of the screen, which will take you to the Welcome page. Then click on \"Sign up Now\" on the right side of the page.     You will be asked to enter the access code listed below, as well as some personal information. Please follow the directions to create your username and password.     Your access code is: [email or web address]  Expires: 2019  8:54 AM     Your access code will  in 90 days. If you need help or a new code, please call your [facility] clinic or [phone].        Care EveryWhere ID     This is your Care " EveryWhere ID. This could be used by other organizations to access your [facility] medical records  [id number]        Equal Access to Services     LOUISE LEE : Juanpablo Davila, wakimda azar, opallesia crouchgwenda yesicoriebrittney, waxmarbella tung wilbertzurdo keymaria t ferrari noelle horvath. So [facility] [phone].    ATENCIÓN: Si habla español, tiene a mcneil disposición servicios gratuitos de asistencia lingüística. Llame al [phone].    We comply with applicable federal civil rights laws and [place] laws. We do not discriminate on the basis of race, color, national origin, age, disability, sex, sexual orientation, or gender identity.               Review of your medicines      START taking        Dose / Directions    LORazepam 0.5 MG tablet   Commonly known as:  ATIVAN   Notes to Patient:  Take as needed        Dose:  0.5 mg   Take 1 tablet (0.5 mg) by mouth every 6 hours as needed for muscle spasms   Quantity:  10 tablet   Refills:  0       oxyCODONE-acetaminophen 5-325 MG per tablet   Commonly known as:  PERCOCET   Notes to Patient:  Take as needed        Dose:  1-2 tablet   Take 1-2 tablets by mouth every 4 hours as needed for moderate to severe pain   Quantity:  40 tablet   Refills:  0         CONTINUE these medicines which may have CHANGED, or have new prescriptions. If we are uncertain of the size of tablets/capsules you have at home, strength may be listed as something that might have changed.        Dose / Directions    * VITAMIN D (CHOLECALCIFEROL) PO   This may have changed:  Another medication with the same name was added. Make sure you understand how and when to take each.        Dose:  5000 Units   Take 5,000 Units by mouth daily   Refills:  0       * cholecalciferol 2000 units tablet   This may have changed:  You were already taking a medication with the same name, and this prescription was added. Make sure you understand how and when to take each.        Dose:  2000 Units   Start taking on:  11/9/2018   Take  2,000 Units by mouth daily   Quantity:  30 tablet   Refills:  0       * Notice:  This list has 2 medication(s) that are the same as other medications prescribed for you. Read the directions carefully, and ask your doctor or other care provider to review them with you.      CONTINUE these medicines which have NOT CHANGED        Dose / Directions    ATORVASTATIN CALCIUM PO        Dose:  10 mg   Take 10 mg by mouth At Bedtime   Refills:  0       CALCIUM-MAGNESIUM-VITAMIN D PO   Notes to Patient:  Resume at discharge        Dose:  1 tablet   Take 1 tablet by mouth 3 times daily   Refills:  0       COQ10 PO   Notes to Patient:  Resume at discharge        Dose:  100 mg   Take 100 mg by mouth daily   Refills:  0       diphenhydrAMINE-acetaminophen  MG tablet   Commonly known as:  TYLENOL PM   Notes to Patient:  Take as needed        Dose:  2 tablet   Take 2 tablets by mouth nightly as needed for sleep   Refills:  0       glucosamine-chondroitin 500-400 MG Caps per capsule   Notes to Patient:  Resume at discharge        Dose:  1 capsule   Take 1 capsule by mouth 3 times daily   Refills:  0       Multi-vitamin Tabs tablet        Dose:  1 tablet   Take 1 tablet by mouth daily [employer] brand, +55 Formula.   Refills:  0       OMEGA 3 PO   Notes to Patient:  Resume at discharge        Dose:  2 capsule   Take 2 capsules by mouth daily (with GLA) Complete Omega brand.   Refills:  0       PROBIOTIC DAILY PO   Notes to Patient:  Resume at discharge        Dose:  1 tablet   Take 1 tablet by mouth daily   Refills:  0       TYLENOL PO   Notes to Patient:  Take as needed        Dose:  1000 mg   Take 1,000 mg by mouth 3 times daily as needed   Refills:  0         STOP taking     ADVIL PO                Where to get your medicines      Some of these will need a paper prescription and others can be bought over the counter. Ask your nurse if you have questions.     Bring a paper prescription for each of these medications      LORazepam 0.5 MG tablet    oxyCODONE-acetaminophen 5-325 MG per tablet       You don't need a prescription for these medications     cholecalciferol 2000 units tablet                Protect others around you: Learn how to safely use, store and throw away your medicines at www.disposemymeds.org.        Information about OPIOIDS     PRESCRIPTION OPIOIDS: WHAT YOU NEED TO KNOW   We gave you an opioid (narcotic) pain medicine. It is important to manage your pain, but opioids are not always the best choice. You should first try all the other options your care team gave you. Take this medicine for as short a time (and as few doses) as possible.    Some activities can increase your pain, such as bandage changes or therapy sessions. It may help to take your pain medicine 30 to 60 minutes before these activities. Reduce your stress by getting enough sleep, working on hobbies you enjoy and practicing relaxation or meditation. Talk to your care team about ways to manage your pain beyond prescription opioids.    These medicines have risks:    DO NOT drive when on new or higher doses of pain medicine. These medicines can affect your alertness and reaction times, and you could be arrested for driving under the influence (DUI). If you need to use opioids long-term, talk to your care team about driving.    DO NOT operate heavy machinery    DO NOT do any other dangerous activities while taking these medicines.    DO NOT drink any alcohol while taking these medicines.     If the opioid prescribed includes acetaminophen, DO NOT take with any other medicines that contain acetaminophen. Read all labels carefully. Look for the word  acetaminophen  or  Tylenol.  Ask your pharmacist if you have questions or are unsure.    You can get addicted to pain medicines, especially if you have a history of addiction (chemical, alcohol or substance dependence). Talk to your care team about ways to reduce this risk.    All opioids tend to cause  constipation. Drink plenty of water and eat foods that have a lot of fiber, such as fruits, vegetables, prune juice, apple juice and high-fiber cereal. Take a laxative (Miralax, milk of magnesia, Colace, Senna) if you don t move your bowels at least every other day. Other side effects include upset stomach, sleepiness, dizziness, throwing up, tolerance (needing more of the medicine to have the same effect), physical dependence and slowed breathing.    Store your pills in a secure place, locked if possible. We will not replace any lost or stolen medicine. If you don t finish your medicine, please throw away (dispose) as directed by your pharmacist. The [place] [employer]ution [employer] has more information about safe disposal: https://www.pca.[facility].[place].us/living-green/managing-unwanted-medications             Medication List: This is a list of all your medications and when to take them. Check marks below indicate your daily home schedule. Keep this list as a reference.      Medications           Morning Afternoon Evening Bedtime As Needed    ATORVASTATIN CALCIUM PO   Take 10 mg by mouth At Bedtime   Last time this was given:  10 mg on 11/7/2018  9:55 PM   Next Dose Due:  11/8/18                                   CALCIUM-MAGNESIUM-VITAMIN D PO   Take 1 tablet by mouth 3 times daily   Notes to Patient:  Resume at discharge                                         COQ10 PO   Take 100 mg by mouth daily   Notes to Patient:  Resume at discharge                                   diphenhydrAMINE-acetaminophen  MG tablet   Commonly known as:  TYLENOL PM   Take 2 tablets by mouth nightly as needed for sleep   Notes to Patient:  Take as needed                                   glucosamine-chondroitin 500-400 MG Caps per capsule   Take 1 capsule by mouth 3 times daily   Notes to Patient:  Resume at discharge                                         LORazepam 0.5 MG tablet   Commonly known as:  ATIVAN   Take 1 tablet (0.5 mg)  by mouth every 6 hours as needed for muscle spasms   Last time this was given:  1 mg on 11/7/2018  8:34 PM   Notes to Patient:  Take as needed                                   Multi-vitamin Tabs tablet   Take 1 tablet by mouth daily [employer] brand, +55 Formula.   Last time this was given:  1 tablet on 11/8/2018  8:07 AM   Next Dose Due:  11/9/18                                   OMEGA 3 PO   Take 2 capsules by mouth daily (with GLA) Complete Omega brand.   Notes to Patient:  Resume at discharge                                   oxyCODONE-acetaminophen 5-325 MG per tablet   Commonly known as:  PERCOCET   Take 1-2 tablets by mouth every 4 hours as needed for moderate to severe pain   Last time this was given:  2 tablets on 11/8/2018 12:00 PM   Notes to Patient:  Take as needed                                   PROBIOTIC DAILY PO   Take 1 tablet by mouth daily   Notes to Patient:  Resume at discharge                                   TYLENOL PO   Take 1,000 mg by mouth 3 times daily as needed   Notes to Patient:  Take as needed                                   * VITAMIN D (CHOLECALCIFEROL) PO   Take 5,000 Units by mouth daily   Last time this was given:  2,000 Units on 11/8/2018  8:07 AM   Next Dose Due:  11/9/18                                   * cholecalciferol 2000 units tablet   Take 2,000 Units by mouth daily   Start taking on:  11/9/2018   Last time this was given:  2,000 Units on 11/8/2018  8:07 AM                                   * Notice:  This list has 2 medication(s) that are the same as other medications prescribed for you. Read the directions carefully, and ask your doctor or other care provider to review them with you.

## 2018-11-05 NOTE — BRIEF OP NOTE
[facility] Brief Operative Note    Pre-operative diagnosis: DEGENERATIVE SPONDYLOLYTHESIS at L 4-5; SPINAL STENOSIS;  NEUROGENIC CLAUDICATION    Post-operative diagnosis same   Procedure: Procedure(s):  L 4 LAMINECTOMIES L5 HEMILAMINECTOMY ; L4-5 DECOMPRESSION POSTERIOR SPINAL FUSION AND INTERBODY FUSION USING INTRAVERTEBRAL PROSTHESIS ; LOCAL AUTOGRAFT AND INSTRUMENTATION   Surgeon(s): Surgeon(s) and Role:     * Abbe Viera MD - Primary     * Jana Davalos PA-C - Assisting   Estimated blood loss: 250 mL    Specimens: * No specimens in log *   Findings: Severe spinal stenosis at L4-5, severe facet degeneration, partial reduction achieved.

## 2018-11-05 NOTE — IP AVS SNAPSHOT
[facility] [address] [facility] [address] [facility]: [phone]                                              INTERAGENCY TRANSFER FORM - LAB / IMAGING / EKG / EMG RESULTS   2018                    Hospital Admission Date: 2018  SARINA HODGES   : 1960  Sex: Female        Attending Provider: Abbe Viera MD     Allergies:  No Known Allergies    Infection:  None   Service:  SURGERY    Ht:  --   Wt:  76.7 kg (169 lb)   Admission Wt:  76.7 kg (169 lb)    BMI:  --   BSA:  --            Patient PCP Information     Provider PCP Type    Sarina Cuellar MD, MD General         Lab Results - 3 Days      Hemoglobin [[id number]] (Abnormal)  Resulted: 18 0756, Result status: Final result    Ordering provider: Jana Davalos PA-C  18 0000 Resulting lab: [facility]    Specimen Information    Type Source Collected On   Blood  18 0745          Components       Value Reference Range Flag Lab   Hemoglobin 11.2 11.7 - 15.7 g/dL L FrStHsLb            Basic metabolic panel [[id number]] (Abnormal)  Resulted: 18 0802, Result status: Final result    Ordering provider: Jana Davalos PA-C  18 0000 Resulting lab: [facility]    Specimen Information    Type Source Collected On   Blood  18 0735          Components       Value Reference Range Flag Lab   Sodium 138 133 - 144 mmol/L  FrStHsLb   Potassium 4.0 3.4 - 5.3 mmol/L  FrStHsLb   Chloride 106 94 - 109 mmol/L  FrStHsLb   Carbon Dioxide 26 20 - 32 mmol/L  FrStHsLb   Anion Gap 6 3 - 14 mmol/L  FrStHsLb   Glucose 119 70 - 99 mg/dL H FrStHsLb   Urea Nitrogen 5 7 - 30 mg/dL L FrStHsLb   Creatinine 0.69 0.52 - 1.04 mg/dL  FrStHsLb   GFR Estimate 87 >60 mL/min/1.7m2  FrStHsLb   Comment:  Non  GFR Calc   GFR Estimate If Black >90 >60 mL/min/1.7m2  FrStHsLb   Comment:  African American GFR Calc   Calcium 8.2 8.5 - 10.1 mg/dL L FrStHsLb            Hemoglobin  [[id number]] (Abnormal)  Resulted: 11/06/18 0750, Result status: Final result    Ordering provider: Jana Davalos PA-C  11/06/18 0000 Resulting lab: [facility]    Specimen Information    Type Source Collected On   Blood  11/06/18 0735          Components       Value Reference Range Flag Lab   Hemoglobin 10.6 11.7 - 15.7 g/dL L FrStHsLb            Glucose by meter [[id number]]  Resulted: 11/06/18 0600, Result status: Final result    Ordering provider: Abbe Viera MD  11/06/18 0549 Resulting lab: POINT OF CARE TEST, GLUCOSE    Specimen Information    Type Source Collected On     11/06/18 0549          Components       Value Reference Range Flag Lab   Glucose 90 70 - 99 mg/dL  170            Vitamin D Deficiency [[id number]] (Abnormal)  Resulted: 11/05/18 1308, Result status: Final result    Ordering provider: Abbe Viera MD  11/05/18 0607 Resulting lab: [facility]    Specimen Information    Type Source Collected On   Blood  11/05/18 0657          Components       Value Reference Range Flag Lab   Vitamin D Deficiency screening 76 20 - 75 ug/L H 51   Comment:         Season, race, dietary intake, and treatment affect the concentration of   25-hydroxy-Vitamin D. Values may decrease during winter months and increase   during summer months. Values 20-29 ug/L may indicate Vitamin D insufficiency   and values <20 ug/L may indicate Vitamin D deficiency.  Vitamin D determination is routinely performed by an immunoassay specific for   25 hydroxyvitamin D3.  If an individual is on vitamin D2 (ergocalciferol)   supplementation, please specify 25 OH vitamin D2 and D3 level determination by   LCMSMS test [id number].              Sodium [[id number]]  Resulted: 11/05/18 0723, Result status: Final result    Ordering provider: Abbe Viera MD  11/05/18 0616 Resulting lab: [facility]    Specimen Information    Type Source Collected On      11/05/18 0657          Components       Value Reference Range Flag Lab   Sodium 137 133 - 144 mmol/L  FrStHsLb            Testing Performed By     Lab - Abbreviation Name Director Address Valid Date Range    14 - FrStHsLb [facility] Unknown [address] [place] [address] 05/08/15 1057 - Present    51 - Unknown [facility] Unknown [address] [facility] [address] 12/31/14 1010 - Present    170 - Unknown POINT OF CARE TEST, GLUCOSE Unknown Unknown 10/31/11 1114 - Present            Unresulted Labs (24h ago through future)    Start       Ordered    11/06/18 0600  Hemoglobin  AM DRAW,   Routine     Comments:  Last Lab Result: No results found for: HGB    11/05/18 1500         Imaging Results - 3 Days      XR Lumbar Spine Port 1 View [[id number]]  Resulted: 11/06/18 0956, Result status: Final result    Ordering provider: Abbe Viera MD  11/05/18 0823 Resulted by: Noah Lugo MD    Performed: 11/05/18 0825 - 11/05/18 0840 Resulting lab: RADIOLOGY RESULTS    Narrative:       LUMBAR SPINE PORTABLE ONE VIEW November 5, 2018 8:40 AM     HISTORY: Lumbar localization.     COMPARISON: None.      Impression:       IMPRESSION: Single crosstable lateral view demonstrates surgical  instruments directed towards the posterior aspect of L4, over the  spinous process of L4.    NOAH LUGO MD      XR Lumbar Spine Port 2/3 Views [[id number]]  Resulted: 11/06/18 0956, Result status: Final result    Ordering provider: Abbe Viera MD  11/05/18 1055 Resulted by: Noah Lugo MD    Performed: 11/05/18 1100 - 11/05/18 1110 Resulting lab: RADIOLOGY RESULTS    Narrative:       LUMBAR SPINE ONE VIEW PORTABLE November 5, 2018 11:10 AM     HISTORY: Intraoperative film.     COMPARISON: 11/5/2018 at 10:18 AM.      Impression:       IMPRESSION: Two intraoperative views following posterior decompression  and posterior fusion involving L4 and L5. Interbody  prosthesis in  place. There is grade 1 anterolisthesis of L4 on L5.    NOAH LUGO MD      XR Lumbar Spine Port 1 View [[id number]]  Resulted: 11/06/18 0956, Result status: Final result    Ordering provider: Abbe Viera MD  11/05/18 1012 Resulted by: Noah Lugo MD    Performed: 11/05/18 1012 - 11/05/18 1026 Resulting lab: RADIOLOGY RESULTS    Narrative:       LUMBAR SPINE PORTABLE ONE VIEW November 5, 2018 10:26 AM     HISTORY: L4-L5 fusion.     COMPARISON: None.      Impression:       IMPRESSION: Crosstable lateral view demonstrates transpedicular screws  at L4-L5 bilaterally with grade 2 anterolisthesis of L4 and L5.    NOAH LUGO MD      Testing Performed By     Lab - Abbreviation Name Director Address Valid Date Range    104 - Rad Rslts RADIOLOGY RESULTS Unknown Unknown 02/16/05 1553 - Present                Encounter-Level Documents:     There are no encounter-level documents.      Order-Level Documents - 11/05/2018:     Scan on 11/1/2018  8:41 AM by Outside, Provider : EKG  [facility]   10/17/2018 (below)

## 2018-11-05 NOTE — ANESTHESIA CARE TRANSFER NOTE
Patient: Monika Sharp    Procedure(s):  L 4 LAMINECTOMIES L5 HEMILAMINECTOMY ; L4-5 DECOMPRESSION POSTERIOR SPINAL FUSION AND INTERBODY FUSION USING INTRAVERTEBRAL PROSTHESIS ; LOCAL AUTOGRAFT AND INSTRUMENTATION    Diagnosis: DEGENERATIVE SPINAL STENOSIS ; L 4-5 SPONDYLOLYTHESIS ; NEUROGENIC CLAUDICATION   Diagnosis Additional Information: No value filed.    Anesthesia Type:   General, ETT     Note:  Airway :Face Mask  Patient transferred to:PACU  Comments: Pt exhibits spont resps, all monitors and alarms on in pacu, report given to RN, vss.Handoff Report: Identifed the Patient, Identified the Reponsible Provider, Reviewed the pertinent medical history, Discussed the surgical course, Reviewed Intra-OP anesthesia mangement and issues during anesthesia, Set expectations for post-procedure period and Allowed opportunity for questions and acknowledgement of understanding      Vitals: (Last set prior to Anesthesia Care Transfer)    CRNA VITALS  11/5/2018 1122 - 11/5/2018 1159      11/5/2018             Pulse: 78    SpO2: 98 %    Resp Rate (set): 10                Electronically Signed By: GRAYSON Arellano CRNA  November 5, 2018  11:59 AM

## 2018-11-05 NOTE — PROGRESS NOTES
Admission medication history interview status for the 11/5/2018  admission is complete. See EPIC admission navigator for prior to admission medications     Medication history source reliability:Good    Medication history interview source(s):Patient    Medication history resources (including written lists, pill bottles, clinic record):Patient mailed in her medication list prior to surgery    Primary pharmacy.[facility]    Additional medication history information not noted on PTA med list :None    Time spent in this activity: 40 minutes    Prior to Admission medications    Medication Sig Last Dose Taking? Auth Provider   Acetaminophen (TYLENOL PO) Take 1,000 mg by mouth 3 times daily as needed  11/4/2018 at AM Yes Reported, Patient   ATORVASTATIN CALCIUM PO Take 10 mg by mouth At Bedtime 11/3/2018 at HS Yes Reported, Patient   CALCIUM-MAGNESIUM-VITAMIN D PO Take 1 tablet by mouth 3 times daily  11/2/2018 at PM Yes Reported, Patient   Coenzyme Q10 (COQ10 PO) Take 100 mg by mouth daily 11/2/2018 at AM Yes Reported, Patient   diphenhydrAMINE-acetaminophen (TYLENOL PM)  MG tablet Take 2 tablets by mouth nightly as needed for sleep 11/1/2018 at HS Yes Reported, Patient   glucosamine-chondroitin 500-400 MG CAPS Take 1 capsule by mouth 3 times daily  11/2/2018 at PM Yes Reported, Patient   Ibuprofen (ADVIL PO) Take 400 mg by mouth every 4 hours as needed  10/24/2018 at PRN Yes Reported, Patient   multivitamin, therapeutic with minerals (MULTI-VITAMIN) TABS tablet Take 1 tablet by mouth daily [place] brand, +55 Formula. 11/2/2018 at AM Yes Reported, Patient   Omega-3 Fatty Acids (OMEGA 3 PO) Take 2 capsules by mouth daily (with GLA) Complete Omega brand. 11/2/2018 at AM Yes Reported, Patient   Probiotic Product (PROBIOTIC DAILY PO) Take 1 tablet by mouth daily 11/2/2018 at AM Yes Reported, Patient   VITAMIN D, CHOLECALCIFEROL, PO Take 5,000 Units by mouth daily 11/2/2018 at AM Yes Reported, Patient

## 2018-11-06 ENCOUNTER — APPOINTMENT (OUTPATIENT)
Dept: PHYSICAL THERAPY | Facility: CLINIC | Age: 58
DRG: 455 | End: 2018-11-06
Attending: ORTHOPAEDIC SURGERY
Payer: COMMERCIAL

## 2018-11-06 ENCOUNTER — APPOINTMENT (OUTPATIENT)
Dept: PHYSICAL THERAPY | Facility: CLINIC | Age: 58
DRG: 455 | End: 2018-11-06
Attending: PHYSICIAN ASSISTANT
Payer: COMMERCIAL

## 2018-11-06 LAB
ANION GAP SERPL CALCULATED.3IONS-SCNC: 6 MMOL/L (ref 3–14)
BUN SERPL-MCNC: 5 MG/DL (ref 7–30)
CALCIUM SERPL-MCNC: 8.2 MG/DL (ref 8.5–10.1)
CHLORIDE SERPL-SCNC: 106 MMOL/L (ref 94–109)
CO2 SERPL-SCNC: 26 MMOL/L (ref 20–32)
CREAT SERPL-MCNC: 0.69 MG/DL (ref 0.52–1.04)
GFR SERPL CREATININE-BSD FRML MDRD: 87 ML/MIN/1.7M2
GLUCOSE BLDC GLUCOMTR-MCNC: 90 MG/DL (ref 70–99)
GLUCOSE SERPL-MCNC: 119 MG/DL (ref 70–99)
HGB BLD-MCNC: 10.6 G/DL (ref 11.7–15.7)
POTASSIUM SERPL-SCNC: 4 MMOL/L (ref 3.4–5.3)
SODIUM SERPL-SCNC: 138 MMOL/L (ref 133–144)

## 2018-11-06 PROCEDURE — 25000132 ZZH RX MED GY IP 250 OP 250 PS 637: Performed by: PHYSICIAN ASSISTANT

## 2018-11-06 PROCEDURE — 36415 COLL VENOUS BLD VENIPUNCTURE: CPT | Performed by: PHYSICIAN ASSISTANT

## 2018-11-06 PROCEDURE — 25000128 H RX IP 250 OP 636: Performed by: PHYSICIAN ASSISTANT

## 2018-11-06 PROCEDURE — 40000193 ZZH STATISTIC PT WARD VISIT

## 2018-11-06 PROCEDURE — 80048 BASIC METABOLIC PNL TOTAL CA: CPT | Performed by: PHYSICIAN ASSISTANT

## 2018-11-06 PROCEDURE — 85018 HEMOGLOBIN: CPT | Performed by: PHYSICIAN ASSISTANT

## 2018-11-06 PROCEDURE — 97530 THERAPEUTIC ACTIVITIES: CPT | Mod: GP

## 2018-11-06 PROCEDURE — 00000146 ZZHCL STATISTIC GLUCOSE BY METER IP

## 2018-11-06 PROCEDURE — 97161 PT EVAL LOW COMPLEX 20 MIN: CPT | Mod: GP

## 2018-11-06 PROCEDURE — 12000000 ZZH R&B MED SURG/OB

## 2018-11-06 RX ADMIN — CEFAZOLIN 1 G: 1 INJECTION, POWDER, FOR SOLUTION INTRAMUSCULAR; INTRAVENOUS at 03:26

## 2018-11-06 RX ADMIN — LORAZEPAM 1 MG: 0.5 TABLET ORAL at 21:42

## 2018-11-06 RX ADMIN — OXYCODONE HYDROCHLORIDE AND ACETAMINOPHEN 2 TABLET: 5; 325 TABLET ORAL at 06:06

## 2018-11-06 RX ADMIN — OXYCODONE HYDROCHLORIDE AND ACETAMINOPHEN 2 TABLET: 5; 325 TABLET ORAL at 22:50

## 2018-11-06 RX ADMIN — OXYCODONE HYDROCHLORIDE AND ACETAMINOPHEN 2 TABLET: 5; 325 TABLET ORAL at 01:46

## 2018-11-06 RX ADMIN — ATORVASTATIN CALCIUM 10 MG: 10 TABLET, FILM COATED ORAL at 21:42

## 2018-11-06 RX ADMIN — CHOLECALCIFEROL CAP 125 MCG (5000 UNIT) 5000 UNITS: 125 CAP at 08:01

## 2018-11-06 RX ADMIN — LORAZEPAM 1 MG: 0.5 TABLET ORAL at 15:26

## 2018-11-06 RX ADMIN — SENNOSIDES AND DOCUSATE SODIUM 2 TABLET: 8.6; 5 TABLET ORAL at 21:42

## 2018-11-06 RX ADMIN — MULTIPLE VITAMINS W/ MINERALS TAB 1 TABLET: TAB at 08:01

## 2018-11-06 RX ADMIN — OXYCODONE HYDROCHLORIDE AND ACETAMINOPHEN 2 TABLET: 5; 325 TABLET ORAL at 14:15

## 2018-11-06 RX ADMIN — OXYCODONE HYDROCHLORIDE AND ACETAMINOPHEN 2 TABLET: 5; 325 TABLET ORAL at 18:30

## 2018-11-06 RX ADMIN — OXYCODONE HYDROCHLORIDE AND ACETAMINOPHEN 2 TABLET: 5; 325 TABLET ORAL at 10:07

## 2018-11-06 RX ADMIN — LORAZEPAM 0.5 MG: 0.5 TABLET ORAL at 08:22

## 2018-11-06 RX ADMIN — LORAZEPAM 0.5 MG: 0.5 TABLET ORAL at 09:14

## 2018-11-06 RX ADMIN — SENNOSIDES AND DOCUSATE SODIUM 2 TABLET: 8.6; 5 TABLET ORAL at 08:02

## 2018-11-06 ASSESSMENT — ACTIVITIES OF DAILY LIVING (ADL)
ADLS_ACUITY_SCORE: 9

## 2018-11-06 NOTE — PROGRESS NOTES
[place] - Suicide Severity Rating Scale Completed? Yes  If yes, what color did the patient score? White

## 2018-11-06 NOTE — PROGRESS NOTES
History:   Minimal complaints. Syncopal when sitting at edge of bed,  Has not been up yet.  Tolerating liq. diet without difficulty. No flatus. Denies nausea.   Vitals:   B/P: 130/72, T: 98.4, P: 81, R: 16       Lab Results   Component Value Date     11/06/2018     11/05/2018    Lab Results   Component Value Date    CHLORIDE 106 11/06/2018    Lab Results   Component Value Date    BUN 5 11/06/2018      Lab Results   Component Value Date    POTASSIUM 4.0 11/06/2018    Lab Results   Component Value Date    CO2 26 11/06/2018    Lab Results   Component Value Date    CR 0.69 11/06/2018        Lab Results   Component Value Date    HGB 10.6 (L) 11/06/2018         Intake/Output Summary (Last 24 hours) at 11/06/18 1345  Last data filed at 11/06/18 1340   Gross per 24 hour   Intake             2700 ml   Output             7995 ml   Net            -5295 ml      Results for orders placed or performed during the hospital encounter of 11/05/18 (from the past 24 hour(s))   Glucose by meter   Result Value Ref Range    Glucose 90 70 - 99 mg/dL   Basic metabolic panel   Result Value Ref Range    Sodium 138 133 - 144 mmol/L    Potassium 4.0 3.4 - 5.3 mmol/L    Chloride 106 94 - 109 mmol/L    Carbon Dioxide 26 20 - 32 mmol/L    Anion Gap 6 3 - 14 mmol/L    Glucose 119 (H) 70 - 99 mg/dL    Urea Nitrogen 5 (L) 7 - 30 mg/dL    Creatinine 0.69 0.52 - 1.04 mg/dL    GFR Estimate 87 >60 mL/min/1.7m2    GFR Estimate If Black >90 >60 mL/min/1.7m2    Calcium 8.2 (L) 8.5 - 10.1 mg/dL   Hemoglobin   Result Value Ref Range    Hemoglobin 10.6 (L) 11.7 - 15.7 g/dL     Hemovac output: 150 on nights and again at 1:30 pm.  Dressing:   Dry and intact.   Neuro:   Motor: 5/5   Sensation: intact  Abdomen:  Soft and NT, few BS by report  Extremities:   No swellinng or calf tenderness  A/P:   Stable POD # 1   Advance diet, may help with positional hypotension  Mobilize as tolerated.

## 2018-11-06 NOTE — PLAN OF CARE
Problem: Patient Care Overview  Goal: Plan of Care/Patient Progress Review  Outcome: Improving  A&Ox4. CMS intact. Bowel sounds active, -flatus. Full liquid diet. VSS, positional hypotension with PT today. Not OOB yet. Boyer removed this afternoon. Dressing CDI. Hemovac patent-120cc output. Pain decreased with ativan and percocet. Home with assist at discharge.

## 2018-11-06 NOTE — PROGRESS NOTES
11/06/18 1100   Quick Adds   Type of Visit Initial PT Evaluation   Living Environment   Lives With spouse   Living Arrangements house   Home Accessibility stairs to enter home;stairs within home   Number of Stairs to Enter Home 4  (4 platform steps then 3 to enter with bilateral walls )   Number of Stairs Within Home 6  (6 to main level then 5 to bedroom)   Stair Railings at Home inside, present on left side;inside, present at both sides  (to main levels bilateral rails, to br 1 rail)   Self-Care   Dominant Hand right   Usual Activity Tolerance good   Current Activity Tolerance fair   Equipment Currently Used at Home none  (has FWW and SEC at home; grabber )   Functional Level Prior   Ambulation 0-->independent   Transferring 0-->independent   Toileting 0-->independent   Bathing 0-->independent   Dressing 0-->independent   Eating 0-->independent   Communication 0-->understands/communicates without difficulty   Swallowing 0-->swallows foods/liquids without difficulty   Cognition 0 - no cognition issues reported   Fall history within last six months no   Which of the above functional risks had a recent onset or change? none   General Information   Onset of Illness/Injury or Date of Surgery - Date 11/05/18   Referring Physician Jana Davalos PA-C   Patient/Family Goals Statement Return home with spouse assist    Pertinent History of Current Problem (include personal factors and/or comorbidities that impact the POC) Patient admitted on 11/5/18 for DEGENERATIVE SPONDYLOLYTHESIS at L 4-5; SPINAL STENOSIS;  NEUROGENIC CLAUDICATION and is now POD-1 from L 4 LAMINECTOMIES L5 HEMILAMINECTOMY ; L4-5 DECOMPRESSION POSTERIOR SPINAL FUSION AND INTERBODY FUSION USING INTRAVERTEBRAL PROSTHESIS ; LOCAL AUTOGRAFT AND INSTRUMENTATION. Patient with no notable PMH.    Precautions/Limitations fall precautions;spinal precautions   Weight-Bearing Status - LLE full weight-bearing   Weight-Bearing Status - RLE full  "weight-bearing   General Observations Patient supine in bed upon arrival of therapist, agreeable to working with PT.    Cognitive Status Examination   Orientation orientation to person, place and time   Level of Consciousness alert   Follows Commands and Answers Questions 100% of the time;able to follow multistep instructions   Personal Safety and Judgment intact   Pain Assessment   Patient Currently in Pain Yes, see Vital Sign flowsheet   Integumentary/Edema   Integumentary/Edema Comments Dressing intact on low back, hemovac drain present    Posture    Posture Forward head position   Range of Motion (ROM)   ROM Comment B LE ROM WNL    Strength   Strength Comments Not formally or functionally assessed, low BP at EOB   Bed Mobility   Bed Mobility Comments Supine>sit completed with Min A and log rolling technique    Transfer Skills   Transfer Comments Not assessed, low BP sitting at EOB    Gait   Gait Comments Not assessed, low BP sitting at EOB    Balance   Balance Comments Sitting balance at EOB good with B UE support and supervision    Sensory Examination   Sensory Perception Comments Denies numbness and tingling    Clinical Impression   Criteria for Skilled Therapeutic Intervention yes, treatment indicated   PT Diagnosis Impaired mobility and gait    Influenced by the following impairments pain, weakness   Functional limitations due to impairments bed mobility, transfers, gait, stairs    Clinical Presentation Stable/Uncomplicated   Clinical Presentation Rationale Based on PMH, current presentation, and social support    Clinical Decision Making (Complexity) Low complexity   Therapy Frequency` daily   Predicted Duration of Therapy Intervention (days/wks) 3 days   Anticipated Discharge Disposition Home with Assist   Risk & Benefits of therapy have been explained Yes   Patient, Family & other staff in agreement with plan of care Yes   [facility] AM-PAC TM \"6 Clicks\"   2016, Trustees of [facility], under " "license to [employer].  All rights reserved.   6 Clicks Short Forms Basic Mobility Inpatient Short Form   [facility] AM-PAC  \"6 Clicks\" V.2 Basic Mobility Inpatient Short Form   1. Turning from your back to your side while in a flat bed without using bedrails? 3 - A Little   2. Moving from lying on your back to sitting on the side of a flat bed without using bedrails? 3 - A Little   3. Moving to and from a bed to a chair (including a wheelchair)? 2 - A Lot   4. Standing up from a chair using your arms (e.g., wheelchair, or bedside chair)? 2 - A Lot   5. To walk in hospital room? 2 - A Lot   6. Climbing 3-5 steps with a railing? 2 - A Lot   Basic Mobility Raw Score (Score out of 24.Lower scores equate to lower levels of function) 14   Total Evaluation Time   Total Evaluation Time (Minutes) 10     "

## 2018-11-06 NOTE — PLAN OF CARE
Problem: Patient Care Overview  Goal: Plan of Care/Patient Progress Review  Outcome: Improving  [id number]. Pt arrived to unit from PACU at 1500. A&O, forgetful. CMS intact. VSS. BS hypoactive, increasing throughout shift, -flatus. Tolerating clear liquid diet. Pain managed with Percocet and ativan. Solis and hemovac patent. Plan to remove solis tomorrow am and for PT/OT evals.

## 2018-11-06 NOTE — PLAN OF CARE
Problem: Patient Care Overview  Goal: Plan of Care/Patient Progress Review  PT:  Patient admitted on 11/5/18 for degenerative spondylolythesis at L4-5; spinal stenosis; neurogenic claudication and is now POD-1 from L4 laminectomies, L5 hemilaminectomy; L4-5 decompression posterior spinal fusion and interbody fusion using IV prosthesis; local autograft and instrumentation. Patient lives in a house with her spouse with 4 platform steps to enter + 3 steps with bilateral walls/supports; split level entry with 6 steps and bilateral hand rails to main level then additional 5 steps up with single level. Patient was independent at baseline with no AD.     Discharge Planner PT   Patient plan for discharge: Not stated   Current status: Patient supine in bed upon arrival of therapist, agreeable to working with PT. Educated patient on role of PT and PT POC; went through posture handout provided to patient and educated on spinal precautions and log rolling technique for bed mobility. BP in supine at beginning of session 133/79 and HR 70. Patient completed bed mobility with Min A and cues for log rolling technique. Able to sit at EOB for ~1 minute, increased dizziness noted, BP in sitting 83/36 and HR 55. Patient returned to supine with min A x 2 and repositioned in bed with Mod A x 2. Patient in supine, dizziness improved, BP noted as 114/65 with HR 66. Patient in supine at end of session with all needs in reach and bed alarm on, ice to lumbar incision. RN aware of session.   Barriers to return to prior living situation: Dizziness, pain, current level of assist, decreased tolerance to activity   Recommendations for discharge: Home with assist for household tasks   Rationale for recommendations: Patient most limited in mobility 2/2 to dizziness and orthostatic changes in BP this date, anticipate patient will progress to level of independence for safe discharge home with assist from spouse for household tasks.        Entered by:  Julia Gonzalez 11/06/2018 11:55 AM

## 2018-11-06 NOTE — PLAN OF CARE
Problem: Patient Care Overview  Goal: Plan of Care/Patient Progress Review  Outcome: Improving  A&O x4. CMS intact. Bowel sounds audible/active, -flatus, -bm. VSS. Dressing CDI. Hemovac patent. Dangled bedside this AM- pt reported some dizziness and stated has hx w/ lower BP'S. Boyer in place. Capno in place. C/o incisional pain, decreased with PRN percocet and ice. Tolerating clear liquid diet. Plan to work w/ therapies.

## 2018-11-07 ENCOUNTER — APPOINTMENT (OUTPATIENT)
Dept: PHYSICAL THERAPY | Facility: CLINIC | Age: 58
DRG: 455 | End: 2018-11-07
Attending: ORTHOPAEDIC SURGERY
Payer: COMMERCIAL

## 2018-11-07 ENCOUNTER — APPOINTMENT (OUTPATIENT)
Dept: OCCUPATIONAL THERAPY | Facility: CLINIC | Age: 58
DRG: 455 | End: 2018-11-07
Attending: PHYSICIAN ASSISTANT
Payer: COMMERCIAL

## 2018-11-07 LAB — HGB BLD-MCNC: 11.2 G/DL (ref 11.7–15.7)

## 2018-11-07 PROCEDURE — 97165 OT EVAL LOW COMPLEX 30 MIN: CPT | Mod: GO

## 2018-11-07 PROCEDURE — 97116 GAIT TRAINING THERAPY: CPT | Mod: GP

## 2018-11-07 PROCEDURE — 40000193 ZZH STATISTIC PT WARD VISIT

## 2018-11-07 PROCEDURE — 25000132 ZZH RX MED GY IP 250 OP 250 PS 637: Performed by: PHYSICIAN ASSISTANT

## 2018-11-07 PROCEDURE — 36415 COLL VENOUS BLD VENIPUNCTURE: CPT | Performed by: PHYSICIAN ASSISTANT

## 2018-11-07 PROCEDURE — 97530 THERAPEUTIC ACTIVITIES: CPT | Mod: GP

## 2018-11-07 PROCEDURE — 85018 HEMOGLOBIN: CPT | Performed by: PHYSICIAN ASSISTANT

## 2018-11-07 PROCEDURE — 25000132 ZZH RX MED GY IP 250 OP 250 PS 637: Performed by: ORTHOPAEDIC SURGERY

## 2018-11-07 PROCEDURE — 12000000 ZZH R&B MED SURG/OB

## 2018-11-07 PROCEDURE — 40000133 ZZH STATISTIC OT WARD VISIT

## 2018-11-07 PROCEDURE — 97535 SELF CARE MNGMENT TRAINING: CPT | Mod: GO

## 2018-11-07 RX ADMIN — Medication 10 MG: at 14:20

## 2018-11-07 RX ADMIN — MAGNESIUM HYDROXIDE 30 ML: 400 SUSPENSION ORAL at 03:16

## 2018-11-07 RX ADMIN — LORAZEPAM 1 MG: 0.5 TABLET ORAL at 20:34

## 2018-11-07 RX ADMIN — ATORVASTATIN CALCIUM 10 MG: 10 TABLET, FILM COATED ORAL at 21:55

## 2018-11-07 RX ADMIN — OXYCODONE HYDROCHLORIDE AND ACETAMINOPHEN 2 TABLET: 5; 325 TABLET ORAL at 21:55

## 2018-11-07 RX ADMIN — SENNOSIDES AND DOCUSATE SODIUM 2 TABLET: 8.6; 5 TABLET ORAL at 08:02

## 2018-11-07 RX ADMIN — OXYCODONE HYDROCHLORIDE AND ACETAMINOPHEN 2 TABLET: 5; 325 TABLET ORAL at 03:10

## 2018-11-07 RX ADMIN — MULTIPLE VITAMINS W/ MINERALS TAB 1 TABLET: TAB at 08:02

## 2018-11-07 RX ADMIN — SENNOSIDES AND DOCUSATE SODIUM 2 TABLET: 8.6; 5 TABLET ORAL at 20:34

## 2018-11-07 RX ADMIN — LORAZEPAM 0.5 MG: 0.5 TABLET ORAL at 05:00

## 2018-11-07 RX ADMIN — OXYCODONE HYDROCHLORIDE AND ACETAMINOPHEN 2 TABLET: 5; 325 TABLET ORAL at 08:02

## 2018-11-07 RX ADMIN — VITAMIN D, TAB 1000IU (100/BT) 2000 UNITS: 25 TAB at 08:02

## 2018-11-07 RX ADMIN — OXYCODONE HYDROCHLORIDE AND ACETAMINOPHEN 2 TABLET: 5; 325 TABLET ORAL at 12:24

## 2018-11-07 RX ADMIN — OXYCODONE HYDROCHLORIDE AND ACETAMINOPHEN 2 TABLET: 5; 325 TABLET ORAL at 17:25

## 2018-11-07 RX ADMIN — LORAZEPAM 1 MG: 0.5 TABLET ORAL at 11:07

## 2018-11-07 ASSESSMENT — ACTIVITIES OF DAILY LIVING (ADL)
ADLS_ACUITY_SCORE: 9
ADLS_ACUITY_SCORE: 10
ADLS_ACUITY_SCORE: 9
ADLS_ACUITY_SCORE: 9
ADLS_ACUITY_SCORE: 10
ADLS_ACUITY_SCORE: 9

## 2018-11-07 NOTE — PLAN OF CARE
Problem: Patient Care Overview  Goal: Plan of Care/Patient Progress Review  PT:  Discharge Planner PT   Patient plan for discharge: Home with assist   Current status: Patient supine in bed upon arrival of therapist, states she feels better today and hopeful to walk today. BP monitored throughout session. BP in supine 133/78, HR 84. Supine>sit completed with log rolling technique and CGA. BP in sitting (after sitting 2 minutes): 126/67, HR 86. Patient noting slight dizziness, but agreeable to standing. Sit<>stand completed with FWW and CGA to SBA. BP in standing (after standing 2 minutes): 107/63, HR 92. Patient slightly dizzy, but does not progress with prolonged standing. Patient then ambulated 250 feet with use of FWW and CGA to SBA with w/c follow. Patient with slight dizziness noted but no increase with gait, steady reciprocal gait noted with slightly tentative pattern, but more fluid with increased distance. Patient sitting in chair at end of session, BP noted as 142/52, HR 86; chair alarm on and all need in reach.   Barriers to return to prior living situation: Decreased tolerance to activity, current level of assist   Recommendations for discharge: Home with assist   Rationale for recommendations: Patient most limited by dizziness with mobility, improved this session and patient mobilizing well with CGA to SBA. Anticipate patient will continue to progress to level of independence for safe discharge home with assist from spouse for household tasks.       Entered by: Julia Gonzalez 11/07/2018 12:25 PM

## 2018-11-07 NOTE — PLAN OF CARE
Problem: Patient Care Overview  Goal: Plan of Care/Patient Progress Review  Outcome: Improving  A&Ox4. CMS intact. Bowel sounds active, +flatus, -BM. Up with one, gait belt, walker. Voiding adequately. VSS. Hemovac removed, dressing CDI. C/o incision pain, decreased with ativan and percocet. PRN suppository given this afternoon. Plan for possible discharge home tomorrow.

## 2018-11-07 NOTE — PROGRESS NOTES
History:   Was able to get up to commode yesterday but still somewhat lightheaded. Pre-op symptoms gone.  Tolerating reg diet without difficulty.  No BM yet, + flatus. Pain controlled on oral meds.  Vitals:   B/P: 116/72, T: 97.5, P: 76, R: 16       Lab Results   Component Value Date     11/06/2018     11/05/2018    Lab Results   Component Value Date    CHLORIDE 106 11/06/2018    Lab Results   Component Value Date    BUN 5 11/06/2018      Lab Results   Component Value Date    POTASSIUM 4.0 11/06/2018    Lab Results   Component Value Date    CO2 26 11/06/2018    Lab Results   Component Value Date    CR 0.69 11/06/2018        Lab Results   Component Value Date    HGB 11.2 (L) 11/07/2018    HGB 10.6 (L) 11/06/2018         Intake/Output Summary (Last 24 hours) at 11/07/18 1301  Last data filed at 11/07/18 1111   Gross per 24 hour   Intake             2810 ml   Output             5825 ml   Net            -3015 ml      Results for orders placed or performed during the hospital encounter of 11/05/18 (from the past 24 hour(s))   Hemoglobin   Result Value Ref Range    Hemoglobin 11.2 (L) 11.7 - 15.7 g/dL     Hemovac output: 90 ml, was emptied at 2:30 AM.  Not much in reservoir now and has a serous appearance  Dressing:   Dry and intact.   Neuro:     Motor: 5/5   Sensation: intact  Abdomen:  Soft and nontender  Extremities:   No swelling or calf tenderness  A/P:   Stable POD # 2   Mobilized today.  Possible discharge tomorrow.

## 2018-11-07 NOTE — OP NOTE
Procedure Date: 11/05/2018      DATE OF PROCEDURE:  11/05/2018      PREOPERATIVE DIAGNOSIS:  Degenerative spondylolisthesis at L4-5, spinal stenosis and neurogenic claudication.      POSTOPERATIVE DIAGNOSIS:  Degenerative spondylolisthesis at L4-5, spinal stenosis and neurogenic claudication.      PROCEDURE:  L4 laminectomy, L5 hemilaminectomy, decompression of bilateral L4 and L5 nerves, and posterior lumbar interbody fusion using intervertebral prosthesis, local autograft.  Posterior spinal fusion using local autograft and instrumentation.      SURGEON:  Abbe Viera MD      ASSESSMENT:  Jana Davalos PA-C      ANESTHESIA:  General with endotracheal tube.      INDICATIONS FOR PROCEDURE:  Ms. Sharp is a 58-year-old woman who has a 6-7 month history of low back and bilateral leg pain.  She had a course of physical therapy without success and a trial of anti-inflammatory medications.  Her preoperative evaluation revealed a degenerative spondylolisthesis of L4 on 5 with severe spinal stenosis at that level that was consistent with her symptoms.  Because her symptoms have been refractory to conservative treatment, she elected for operative treatment.  After informed consent was obtained from the patient, satisfactory general anesthesia achieved, the patient was turned to the prone position on the Chepe frame.  Care was taken to pad all bony prominences.  Pulses were palpable in all 4 extremities after positioning.  Her back was prepped and draped in a sterile fashion.  After appropriate patient and site identification a longitudinal incision was made at the L4-5 level.  Marcaine 0.25% with epinephrine was infiltrated in the wound edges to aid with hemostasis and postoperative pain control.  Subcutaneous dissection continued with the Bovie down to lumbar fascia.  Lumbar fascia was divided over the L4 spinous process.  A Kocher clamp was placed in the spinous process.  A lateral x-ray obtained confirming  the position at L4.  The dissection continued subperiosteally on the spinous processes of L3, L4 and L5.  The dissection continued out over the facet joints at 3-4 onto the transverse process of L4 and over the facet joints at L4-L5, under the transverse process of L5.  There was marked hypertrophy of the facet joints bilaterally at L4-5.  Sponges were packed into the posterolateral gutter.  Attention was turned to the midline where the Cynthia bone cutter was used to remove the spinous process of L4 and L5.  The Leksell rongeur was used to remove the majority of the L4 lamina and then the remainder of the lamina was removed with Kerrison rongeurs.  Lateral recess decompression was performed.  The superior edge of the L5 lamina, approximately 3 mm was removed also with Leksell rongeurs.  All the bone removed with the decompression was cleaned and placed through the bone mill for later use as graft.  There was marked lateral recess stenosis bilaterally and marked central stenosis bilaterally at the L4-5 level.  After the decompression, there was no further compression of the central sac or the L4 or L5 nerves.  A Gomez elevator could be passed along the path of the L4 and L5 nerves.  There was no evidence of any further tethering material.  Attention was turned to the pedicles where they were prepared to accept pedicle screws, 6.5 mm x 50 screws were placed bilaterally at L4, on the left at L5 and 45 mm screw was placed on the right at L5.  Prior to placing the screws, the posterior elements were decorticated and 3 mL of the local autograft was placed into the posterolateral gutter and into the facet joints.  A/P and lateral x-rays were obtained showing good position of the screws.  Distraction was placed across the screws on the left side.  The dura was retracted medially and the annulus was cut with a 15-blade.  The disk was removed with curettes and pituitaries and simultaneously distracted up to 10 mm.  The  entirety of the disk was removed with the curettes and pituitaries and then it was filled with approximately 3 mL of the local autograft.  This was swept medially and laterally and then a 10 mm x 11 mm x 30 mm Vy implant was filled with the local autograft and tapped into place with a drift and mallet.  23-28 mm top loading rods were then selected and placed onto the pedicle screws.  Locking nuts were provisionally tightened.  A crosslink was placed between the 2 rods and tightened to appropriate torque using torque and anti-torque wrench.  Additional A/P and lateral x-rays were obtained showing good position of the hardware, good position of the implant at the L4-5 level, and partial reduction of the spondylolisthesis.  The locking nuts were tightened to appropriate torque with a torque anti-torque wrench.  Patency of foramen was again checked with Gomez elevator and the L4-5 foramen bilaterally were found to be open and patent.  The wound was irrigated with copious amounts of antibiotic solution.  The exposed dura was covered with thrombin-soaked Gelfoam.  A medium Hemovac drain was placed in the submuscular space and brought out to the right of the incision.  The lumbar muscle fascia was reapproximated with running simple suture of #2 Stratafix.  A few interrupted figure-of-eight sutures of #1 Vicryl were placed as well.  The skin was closed with multiple subcutaneous sutures of 2-0 Vicryl and a running subcuticular stitch of 3-0 Vicryl.  Sterile Steri-Strips and dressing were applied.  The Hemovac was hooked to suction.  The patient was turned from the Chepe table, awakened, extubated, and left the operating room in good condition.  Estimated blood loss was 250 mL.  Replacement was 2800 mL of crystalloid.  There were no complications.  Total time for the procedure from skin to skin was 210 minutes.      BIANCA Benjamin's assistance was essential during the entire procedure for safe retraction of muscle  and neural tissue, suctioning of blood for visualization and holding alignment during placement of the implants and bone graft.    Fax to my office [phone]         DARIEN VIERA MD             D: 2018   T: 2018   MT: YNES      Name:     SARINA HODGES   MRN:      [id number]        Account:        [id number]   :      1960           Procedure Date: 2018      Document: [id number]       cc: Darien Viera MD

## 2018-11-07 NOTE — PLAN OF CARE
Problem: Patient Care Overview  Goal: Plan of Care/Patient Progress Review  Outcome: Improving  A&Ox4. CMS intact. Ax1 w/gb pivot to commode/chair. Dizzy/orthostatic when upright but improving throughout shift. Flatus +. BS +. Tolerating reg diet with good appetite. Pain managed with PO pain med and ice. Adequate urine output. VSS on RA. Hemovac patent. Dressing CDI.

## 2018-11-07 NOTE — PLAN OF CARE
Problem: Patient Care Overview  Goal: Plan of Care/Patient Progress Review  Outcome: Improving  A&O x4. CMS intact. Bowel sounds audible/active, +flatus, -bm, milk of mag given. VSS. Dressing CDI. Hemovac patent. Up w/ AX1, GB, W-pivot to commode. Pt reporting dizziness when upright. C/o incisional pain, decreased with PRN percocet, ativan and ice. Voiding w/out issues. Tolerating regular diet. Plan to work w/ therapies.

## 2018-11-07 NOTE — PROGRESS NOTES
11/07/18 0915   Quick Adds   Type of Visit Initial Occupational Therapy Evaluation   Living Environment   Lives With spouse  (Available to assist as needed)   Living Arrangements house   Home Accessibility stairs to enter home;stairs within home   Number of Stairs to Enter Home 4   Number of Stairs Within Home 6  (6 to main level and then 5 to bedroom)   Stair Railings at Home inside, present on right side   Transportation Available car   Living Environment Comment Tub/shower combo in bathroom upstairs; walk-in shower on main floor   Self-Care   Dominant Hand right   Usual Activity Tolerance excellent   Current Activity Tolerance fair   Regular Exercise yes   Activity/Exercise Type (Hiking, walking)   Equipment Currently Used at Home walker, rolling   Functional Level Prior   Ambulation 0-->independent   Transferring 0-->independent   Toileting 0-->independent   Bathing 0-->independent   Dressing 0-->independent   Eating 0-->independent   Communication 0-->understands/communicates without difficulty   Swallowing 0-->swallows foods/liquids without difficulty   Cognition 0 - no cognition issues reported   Fall history within last six months no   General Information   Onset of Illness/Injury or Date of Surgery - Date 11/05/18   Referring Physician Abbe Viera MD   Patient/Family Goals Statement Return home   Additional Occupational Profile Info/Pertinent History of Current Problem Pt lives at home with spouse. Pt reported being ind with all I/ADL's prior to hospitalization. Pt 57 y/o female admitted for Spondylolisthesis.   Precautions/Limitations spinal precautions   Cognitive Status Examination   Orientation orientation to person, place and time   Level of Consciousness alert   Able to Follow Commands WNL/WFL   Personal Safety (Cognitive) WNL/WFL   Sensory Examination   Sensory Quick Adds No deficits were identified   Pain Assessment   Patient Currently in Pain Yes, see Vital Sign flowsheet   Posture    Posture Comments WFL for ADL's and spinal precautions   Range of Motion (ROM)   ROM Comment WFL for ADL's and spinal precautions   Strength   Strength Comments WFL for ADL's and spinal precautions   Hand Strength   Hand Strength Comments WFL for ADL's and spinal precautions   Muscle Tone Assessment   Muscle Tone Comments WFL for ADL's and spinal precautions   Coordination   Coordination Comments WFL for ADL's and spinal precautions   Mobility   Bed Mobility Bed mobility skill: Supine to sit;Bed mobility skill: Sit to supine   Bed Mobility Skill: Sit to Supine   Level of [place]: Sit/Supine other (see comments)  (Mod I)   Physical Assist/Nonphysical Assist: Sit/Supine verbal cues   Assistive Device: Sit/Supine bedrail   Bed Mobility Skill: Supine to Sit   Level of [place]: Supine/Sit other (see comments)  (Mod I)   Physical Assist/Nonphysical Assist: Supine/Sit verbal cues   Assistive Device: Supine/Sit bedrail   Transfer Skills   Transfer Transfer Skill: Stand to Sit   Transfer Skill: Sit to Stand   Level of [place]: Sit/Stand other (see comments)  (Mod I)   Physical Assist/Nonphysical Assist: Sit/Stand verbal cues   Transfer Skill: Sit to Stand full weight-bearing   Assistive Device for Transfer: Sit/Stand rolling walker   Toilet Transfer   Toilet Transfer Toilet Transfer Skill   Transfer Skill: Toilet Transfer   Level of [place]: Toilet other (see comments)  (Mod I)   Physical Assist/Nonphysical Assist: Toilet verbal cues   Assistive Device grab bars;rolling walker   Tub/Shower Transfer   Tub/Shower Transfer Transfer Skill: Tub/Shower Transfers   Transfer Skill: Tub/Shower Transfer   Level of [place]: Tub/Shower other (see comments)  (Mod I; simulated walk-in shower transfer completed)   Physical Assist/Nonphysical Assist: Tub/Shower verbal cues   Assistive Device rolling walker   Bathing   Level of [place] independent   Upper Body Dressing   Level of [place]: Dress Upper Body  "independent   Lower Body Dressing   Level of [place]: Dress Lower Body other (see comments)  (Mod I)   Physical Assist/Nonphysical Assist: Dress Lower Body verbal cues   Toileting   Level of [place]: Toilet independent   Grooming   Level of [place]: Grooming independent   Eating/Self Feeding   Level of [place]: Eating independent   Instrumental Activities of Daily Living (IADL)   Previous Responsibilities meal prep;housekeeping;laundry;shopping;yardwork;driving;medication management   Activities of Daily Living Analysis   Impairments Contributing to Impaired Activities of Daily Living pain;post surgical precautions;strength decreased   General Therapy Interventions   Planned Therapy Interventions ADL retraining;strengthening   Clinical Impression   Criteria for Skilled Therapeutic Interventions Met yes, treatment indicated   OT Diagnosis Decreased ind with I/ADL's   Influenced by the following impairments Increased pain; decreased strength   Assessment of Occupational Performance 1-3 Performance Deficits   Identified Performance Deficits Decreased ind with I/ADL's   Clinical Decision Making (Complexity) Low complexity   Predicted Duration of Therapy Intervention (days/wks) Eval and one treat   Anticipated Discharge Disposition Home with Assist   Risks and Benefits of Treatment have been explained. Yes   Patient, Family & other staff in agreement with plan of care Yes   [facility] AM-PAC TM \"6 Clicks\"   2016, Trustees of [facility], under license to [email or web address].  All rights reserved.   6 Clicks Short Forms Daily Activity Inpatient Short Form   Total Evaluation Time   Total Evaluation Time (Minutes) 8     "

## 2018-11-07 NOTE — PLAN OF CARE
Problem: Patient Care Overview  Goal: Plan of Care/Patient Progress Review  Discharge Planner OT   Patient plan for discharge: Return home   Current status: OT orders received and reviewed. Eval and one treat completed. Pt 57 y/o female admitted for Spondylolisthesis. Pt completed bed mobility with log-rolling approach with mod I and verbal cues. Pt demonstrated ability to don/doff socks bilaterally with mod I and verbal cues. Pt completed sit <> stand transfer with mod I and verbal cues. Pt completed toilet transfer and toileting task with mod I, use of grab bars, and verbal cues. Pt completed simulated walk-in shower transfer with mod I, FWW, and verbal cues. Pt able to recall all spinal precautions. Pt reported no OT concerns prior to discharge.   Barriers to return to prior living situation: Decreased ind with dressing   Recommendations for discharge: Home with spouse assist with clothing retrieval   Rationale for recommendations: Pt completed all ADL's with verbal cues and mod I. Pt able to follow spinal precautions during ADL tasks. Pt progressing well. No IP OT concerns.        Entered by: Rhoda Armijo 11/07/2018 10:55 AM     Occupational Therapy Discharge Summary    Reason for therapy discharge:    All goals and outcomes met, no further needs identified.    Progress towards therapy goal(s). See goals on Care Plan in [facility] electronic health record for goal details.  Goals met    Therapy recommendation(s):    No further therapy is recommended. Spouse to assist as needed with I/ADL's.

## 2018-11-08 ENCOUNTER — APPOINTMENT (OUTPATIENT)
Dept: PHYSICAL THERAPY | Facility: CLINIC | Age: 58
DRG: 455 | End: 2018-11-08
Attending: ORTHOPAEDIC SURGERY
Payer: COMMERCIAL

## 2018-11-08 VITALS
HEART RATE: 76 BPM | RESPIRATION RATE: 16 BRPM | BODY MASS INDEX: 27 KG/M2 | TEMPERATURE: 98.6 F | DIASTOLIC BLOOD PRESSURE: 71 MMHG | WEIGHT: 172.4 LBS | SYSTOLIC BLOOD PRESSURE: 120 MMHG | OXYGEN SATURATION: 99 %

## 2018-11-08 PROBLEM — M48.062 SPINAL STENOSIS OF LUMBAR REGION WITH NEUROGENIC CLAUDICATION: Status: ACTIVE | Noted: 2018-11-08

## 2018-11-08 PROBLEM — Z91.89 AT HIGH RISK FOR BREAST CANCER: Status: ACTIVE | Noted: 2018-01-05

## 2018-11-08 LAB — HGB BLD-MCNC: 10.9 G/DL (ref 11.7–15.7)

## 2018-11-08 PROCEDURE — 97530 THERAPEUTIC ACTIVITIES: CPT | Mod: GP | Performed by: PHYSICAL THERAPIST

## 2018-11-08 PROCEDURE — 36415 COLL VENOUS BLD VENIPUNCTURE: CPT | Performed by: PHYSICIAN ASSISTANT

## 2018-11-08 PROCEDURE — 25000132 ZZH RX MED GY IP 250 OP 250 PS 637: Performed by: PHYSICIAN ASSISTANT

## 2018-11-08 PROCEDURE — 40000193 ZZH STATISTIC PT WARD VISIT: Performed by: PHYSICAL THERAPIST

## 2018-11-08 PROCEDURE — 85018 HEMOGLOBIN: CPT | Performed by: PHYSICIAN ASSISTANT

## 2018-11-08 PROCEDURE — 25000132 ZZH RX MED GY IP 250 OP 250 PS 637: Performed by: ORTHOPAEDIC SURGERY

## 2018-11-08 PROCEDURE — 97116 GAIT TRAINING THERAPY: CPT | Mod: GP | Performed by: PHYSICAL THERAPIST

## 2018-11-08 RX ORDER — LORAZEPAM 0.5 MG/1
0.5 TABLET ORAL EVERY 6 HOURS PRN
Qty: 10 TABLET | Refills: 0 | Status: SHIPPED | OUTPATIENT
Start: 2018-11-08

## 2018-11-08 RX ORDER — OXYCODONE AND ACETAMINOPHEN 5; 325 MG/1; MG/1
1-2 TABLET ORAL EVERY 4 HOURS PRN
Qty: 40 TABLET | Refills: 0 | Status: SHIPPED | OUTPATIENT
Start: 2018-11-08

## 2018-11-08 RX ADMIN — SENNOSIDES AND DOCUSATE SODIUM 2 TABLET: 8.6; 5 TABLET ORAL at 08:07

## 2018-11-08 RX ADMIN — OXYCODONE HYDROCHLORIDE AND ACETAMINOPHEN 2 TABLET: 5; 325 TABLET ORAL at 07:42

## 2018-11-08 RX ADMIN — OXYCODONE HYDROCHLORIDE AND ACETAMINOPHEN 2 TABLET: 5; 325 TABLET ORAL at 12:00

## 2018-11-08 RX ADMIN — VITAMIN D, TAB 1000IU (100/BT) 2000 UNITS: 25 TAB at 08:07

## 2018-11-08 RX ADMIN — MULTIPLE VITAMINS W/ MINERALS TAB 1 TABLET: TAB at 08:07

## 2018-11-08 RX ADMIN — OXYCODONE HYDROCHLORIDE AND ACETAMINOPHEN 2 TABLET: 5; 325 TABLET ORAL at 03:29

## 2018-11-08 ASSESSMENT — ACTIVITIES OF DAILY LIVING (ADL)
ADLS_ACUITY_SCORE: 10

## 2018-11-08 NOTE — PLAN OF CARE
Problem: Patient Care Overview  Goal: Plan of Care/Patient Progress Review  Outcome: Improving  A&O x4. CMS intact. Bowel sounds audible/active, +flatus, had small BM. VSS. Dressing CDI.  Up w/ AX1, GB, W. Reports dizziness improving when ambulating. C/o incisional pain, decreased with PRN percocet, ativan and ice. Voiding w/out issues. Tolerating regular diet. Plan to work w/ therapies.

## 2018-11-08 NOTE — PLAN OF CARE
Problem: Patient Care Overview  Goal: Plan of Care/Patient Progress Review  Pt a/o x 4. VSS. Pain well controlled with prn percocet. CMS intact, dressing CDI. Pt worried about having a BM, small flakes in toilet. Passing gas. Walked hallways with therapy. Tolerating regular diet, up with SBA.

## 2018-11-08 NOTE — PROGRESS NOTES
History:   Minimal complaints. Pre-op symptoms gone.  Tolerating reg diet without difficulty.  I'll be him yesterday.  Ambulating in hallway independently.  is no longer feeling dizzy or lightheaded.  Vitals:   B/P: 131/66, T: 97.8, P: 76, R: 18       Lab Results   Component Value Date     11/06/2018     11/05/2018    Lab Results   Component Value Date    CHLORIDE 106 11/06/2018    Lab Results   Component Value Date    BUN 5 11/06/2018      Lab Results   Component Value Date    POTASSIUM 4.0 11/06/2018    Lab Results   Component Value Date    CO2 26 11/06/2018    Lab Results   Component Value Date    CR 0.69 11/06/2018        Lab Results   Component Value Date    HGB 10.9 (L) 11/08/2018    HGB 11.2 (L) 11/07/2018    HGB 10.6 (L) 11/06/2018         Intake/Output Summary (Last 24 hours) at 11/08/18 0830  Last data filed at 11/07/18 1111   Gross per 24 hour   Intake              980 ml   Output              800 ml   Net              180 ml      Results for orders placed or performed during the hospital encounter of 11/05/18 (from the past 24 hour(s))   Hemoglobin   Result Value Ref Range    Hemoglobin 10.9 (L) 11.7 - 15.7 g/dL       Dressing:   Dry and intact.   Neuro:   Motor: 5/5   Sensation: intact  Abdomen:  Soft and nontender, bowel sounds active by report.  Extremities:   No swelling or calf tenderness  A/P:   Stable POD # 3   home today.    Discharge summary dictated

## 2018-11-08 NOTE — PLAN OF CARE
Problem: Patient Care Overview  Goal: Plan of Care/Patient Progress Review  Discharge Planner PT   Patient plan for discharge: Home with assist   Current status: Pt able to recall 3/3 spinal precautions, needs cues during mobility tasks. Pt transfers supine<>sit IND with log roll technique. Pt transfers sit<>stand to FWW with Mod IND, SBA without AD. Pt ambulates 150' with FWW and Mod IND, ' without AD. Pt able to ascend/descend 6 stairs with single rails and SBA. Reviewed home walking program.   Barriers to return to prior living situation: None anticipated with spouse assist  Recommendations for discharge: Home with assist of spouse for household tasks and supervision on stairs  Rationale for recommendations: Pt progressing very well with mobility. Anticipate safe disch to home with spouse.       Entered by: Maty Hastings 11/08/2018 9:33 AM

## 2018-11-08 NOTE — DISCHARGE INSTRUCTIONS
INSTRUCTIONS FOR LUMBAR SURGICAL PATIENTS  Abbe Viera M.D.--[facility]    POST-OPERATIVE INSTRUCTIONS (AFTER SURGERY):    INCISION/WOUND CARE:                1.   If you are discharged from the hospital with a dressing over your incision you may remove and replace it 2 days after leaving the hospital.     2.   If you have sutures that dissolve, the incision must be covered when showering for 5 days after surgery. On the 6th day, you may take a shower normally without covering the wound. But do not scrub the wound.     3.  The small pieces of tape over your incision are called Steri-strips, they can be   removed when they are loose or after 2 weeks.                         4.    If you have staples, your incision must remain dry and covered until they are removed 2 weeks after your surgery. Please call Chel at [phone] to make an appointment to have these removed when you have returned home from the hospital.    5.   Please look at your incisions daily and call Chel at [phone] immediately if you notice any redness, swelling, drainage, or if you develop a fever greater than 101. If after hours or weekends call [phone] and speak to the on-call physician.    6.  Avoid wearing tight clothing over your incisions.    7. Absolutely no bathtubs, hot tubs, swimming in pools or lakes, or any submersion/soaking before the incision is completely healed (no open areas or scabs are present).    POST OPERATIVE ACTIVITY LIMITATIONS:    1. No lifting over 10 pounds (gallon of milk) above your chest or below your waist.     2. Avoid repetitive twisting or bending i.e. raking, sweeping, shoveling etc.    3. Walking stimulates the healing process. Dr. Viera wants you to accomplish a minimum of 45 minutes of sustained walking per day for exercise. You are encouraged to walk several times a day and there is no limit on how far you can walk. In the beginning you may only be able to walk 5-15  minutes at a time that is okay just do this a minimum of 4-10x/day.  4. You may remove your white stockings (nita hose) for   hour twice a day. You may discontinue wearing the stockings when you are not spending any time in bed during the day and are walking at least hourly.    5. You should do the Adherent Nerve Root Stretch exercise 4-5 times a day; please see the attached instruction sheet for how to do these.    6. You may begin driving again when you are no longer taking the narcotic pain medication and feel comfortable with being able to navigate amongst other drivers.    7. You may return to work when you are no longer taking the narcotic pain medication. You must observe the 10-pound lifting restriction (nothing below waist or above chest), frequent change of position from sit, stand, and walking and avoid repetitive bending and twisting.    8. Advancement of physical activities will be discussed at each follow up appointment with Dr. Viera. Physical therapy, if needed, will also be discussed at each appointment.    9. Gentle sexual activity is okay. Be a passive participant.    POST OPERATIVE MEDICATIONS:    1. If your surgery INCLUDED A FUSION  DO NOT take Ibuprofen, Motrin, Advil, Aleve or any other anti-inflammatory medication or aspirin products for 3 months after surgery. This also includes any medication taken for chronic inflammatory conditions i.e. Methotrexate, Remicaid, Prednisone etc. unless otherwise instructed. Only Tylenol or Tylenol based medications are okay during this time frame.    2. If your surgery DID NOT include a fusion you may resume any over-the-counter     anti-inflammatories (Advil, Ibuprofen, Naproxen etc.) 3-5 days after the surgery.    3. We recommend 3-8 ounce glasses of milk daily and a daily supplement of Vitamin D 2000 i.u./day for fusion healing and bone growth.    4. Poor nutrition can lead to delayed wound healing and constipation. Good sources of lean proteins and fresh  fruits and vegetables will help with this.     5. Narcotic pain medications will also cause constipation. Using over the counter stool softeners, drinking plenty of fluids, ambulation, and good nutrition can help prevent this from occurring.   If you have any questions regarding these instructions please contact   Chel at [phone].

## 2018-11-08 NOTE — PLAN OF CARE
Problem: Patient Care Overview  Goal: Plan of Care/Patient Progress Review  Pt a/o x 4. VSS. Pain well controlled with prn percocet. CMS intact, dressing CDI. Dressing changed before she went home. Denies lightheadedness/ dizziness. Tolerating regular diet. Discharge instructions given, all questions answered. Discharge medications given, all belongings sent with pt. Pt set to discharge home with . W/c ride to car via transport NA.

## 2018-11-08 NOTE — PLAN OF CARE
Problem: Patient Care Overview  Goal: Plan of Care/Patient Progress Review  Physical Therapy Discharge Summary    Reason for therapy discharge:    All goals and outcomes met, no further needs identified.    Progress towards therapy goal(s). See goals on Care Plan in [facility] electronic health record for goal details.  Goals met    Therapy recommendation(s):    Home walking program

## 2018-11-09 NOTE — DISCHARGE SUMMARY
Admit Date:     11/05/2018   Discharge Date:     11/08/2018      DATE OF ADMISSION:  11/05/2018.      DATE OF DISCHARGE:  11/08/2018.      DIAGNOSIS:  Degenerative spondylolisthesis, spinal stenosis and neurogenic claudication.      PROCEDURE THIS ADMISSION:  L4 laminectomy, L5 hemilaminectomy, decompression of bilateral L4 and L5 nerves, posterior lumbar interbody fusion at L4-L5 using intervertebral prosthesis, local autograft and instrumentation.      HISTORY OF PRESENT ILLNESS:  Ms. Sharp is a 58-year-old woman with a 6-7 month history of back and bilateral leg pain.  She had a course of physical therapy without success and a trial of anti-inflammatory medications.  Her preoperative evaluation revealed degenerative spondylolisthesis of L4 on 5 with severe central stenosis at that level which was consistent with her symptoms.  Because her symptoms have been refractory to conservative treatment, she elected for operative treatment.      HOSPITAL COURSE:  On the day of admission, she was taken to the operating room where she underwent the previously mentioned procedure.  She tolerated the procedure without difficulty, returned to regular [facility] postoperatively.  Her postoperative course was unremarkable.  Initially she had some dizziness when she was mobilized, but this resolved and by the time of discharge, she was ambulating independently in the hallway with a walker.  She was comfortable on oral pain medication, was eating, voiding and stooling without difficulty.  She received no blood products during this admission.  Her vitamin D3 level was checked at the time of admission and was found to be 74.  She was taking 5000 international units of vitamin D daily and because of his high level it was decreased to 2000 international units a day.  Her other discharge medications include Percocet 1-2 tablets p.o. q.4-6 hours p.r.n. for pain.  She was given 40 tablets.  She was instructed to resume her  preoperative medications including atorvastatin, calcium, Coenzyme Q, Benadryl, glucosamine chondroitin sulfate, multivitamin, omega 3 fatty acids, probiotic.  She was also given a prescription for Ativan 0.5 mg to take 1 tablet p.o. q.6 hours p.r.n. for muscle spasms.      DISCHARGE INSTRUCTIONS:  She will follow up in my clinic 6 weeks from the time of discharge.  She was instructed to avoid lifting, bending and twisting during the followup period.         DARIEN VIERA MD             D: 2018   T: 2018   MT: NIDIA      Name:     SARINA HODGES   MRN:      [id number]        Account:        [id number]   :      1960           Admit Date:     2018                                  Discharge Date: 2018      Document: [id number]       cc: Sarina Viera MD

## (undated) DEVICE — SU VICRYL 2-0 CP-1 27" J466H

## (undated) DEVICE — GLOVE PROTEXIS BLUE W/NEU-THERA 7.0  2D73EB70

## (undated) DEVICE — DRAIN ROUND W/RESERV KIT JACKSON PRATT 10FR 400ML SU130-402D

## (undated) DEVICE — PAD POSITIONING KIT HIP DISP 5844-17

## (undated) DEVICE — DRAPE SHEET REV FOLD 3/4 9349

## (undated) DEVICE — NDL SPINAL 18GA 3.5" 405184

## (undated) DEVICE — ESU GROUND PAD UNIVERSAL W/O CORD

## (undated) DEVICE — PACK LARGE SPINE SNE15LSFSE

## (undated) DEVICE — SYR 03ML LL W/O NDL 309657

## (undated) DEVICE — SOL WATER IRRIG 1000ML BOTTLE 2F7114

## (undated) DEVICE — GLOVE PROTEXIS W/NEU-THERA 6.5  2D73TE65

## (undated) DEVICE — PREP CHLORAPREP 26ML TINTED ORANGE  260815

## (undated) DEVICE — NDL 25GA 1.5" 305127

## (undated) DEVICE — BLADE BONE MILL STRK 3.2MM FINE 5400-702-000

## (undated) DEVICE — ESU ELEC BLADE 2.75" COATED/INSULATED E1455

## (undated) DEVICE — NDL BLUNT 15GA 1.5"

## (undated) DEVICE — STRATAFIX

## (undated) DEVICE — GOWN XXL 9575

## (undated) DEVICE — SU VICRYL 3-0 PS-1 18" UND J683

## (undated) DEVICE — NDL 19GA 1.5"

## (undated) DEVICE — SU VICRYL 1 CT-1 27" J341H

## (undated) DEVICE — LINEN TOWEL PACK X5 5464

## (undated) DEVICE — DRAPE STERI TOWEL SM 1000

## (undated) DEVICE — GLOVE PROTEXIS W/NEU-THERA 8.5  2D73TE85

## (undated) DEVICE — STPL SKIN PROXIMATE 35 WIDE PMW35

## (undated) DEVICE — SU VICRYL 3-0 PS-2 18" UND J497G

## (undated) DEVICE — COVER TABLE POLY 65X90" 8186

## (undated) DEVICE — SUCTION FRAZIER 12FR W/HANDLE K73

## (undated) DEVICE — CUSHION INSERT LG PRONE VIEW JACKSON TABLE

## (undated) DEVICE — RX SURGIFLO W/THROMBIN KIT 2ML 1991

## (undated) DEVICE — GLOVE PROTEXIS BLUE W/NEU-THERA 8.5  2D73EB85

## (undated) DEVICE — SPONGE SURGIFOAM 100 1974

## (undated) DEVICE — SUCTION CANISTER MEDIVAC LINER 3000ML W/LID 65651-530

## (undated) RX ORDER — BUPIVACAINE HYDROCHLORIDE AND EPINEPHRINE 5; 5 MG/ML; UG/ML
INJECTION, SOLUTION EPIDURAL; INTRACAUDAL; PERINEURAL
Status: DISPENSED
Start: 2018-11-05

## (undated) RX ORDER — PROPOFOL 10 MG/ML
INJECTION, EMULSION INTRAVENOUS
Status: DISPENSED
Start: 2018-11-05

## (undated) RX ORDER — CEFAZOLIN SODIUM 1 G/3ML
INJECTION, POWDER, FOR SOLUTION INTRAMUSCULAR; INTRAVENOUS
Status: DISPENSED
Start: 2018-11-05

## (undated) RX ORDER — ALBUMIN, HUMAN INJ 5% 5 %
SOLUTION INTRAVENOUS
Status: DISPENSED
Start: 2018-11-05

## (undated) RX ORDER — LIDOCAINE HYDROCHLORIDE 20 MG/ML
INJECTION, SOLUTION EPIDURAL; INFILTRATION; INTRACAUDAL; PERINEURAL
Status: DISPENSED
Start: 2018-11-05

## (undated) RX ORDER — FENTANYL CITRATE 50 UG/ML
INJECTION, SOLUTION INTRAMUSCULAR; INTRAVENOUS
Status: DISPENSED
Start: 2018-11-05

## (undated) RX ORDER — CEFAZOLIN SODIUM 2 G/100ML
INJECTION, SOLUTION INTRAVENOUS
Status: DISPENSED
Start: 2018-11-05

## (undated) RX ORDER — OXYCODONE HCL 10 MG/1
TABLET, FILM COATED, EXTENDED RELEASE ORAL
Status: DISPENSED
Start: 2018-11-05

## (undated) RX ORDER — EPINEPHRINE 1 MG/ML
INJECTION, SOLUTION INTRAMUSCULAR; SUBCUTANEOUS
Status: DISPENSED
Start: 2018-11-05

## (undated) RX ORDER — HYDROXYZINE HYDROCHLORIDE 50 MG/ML
INJECTION, SOLUTION INTRAMUSCULAR
Status: DISPENSED
Start: 2018-11-05

## (undated) RX ORDER — NEOSTIGMINE METHYLSULFATE 1 MG/ML
VIAL (ML) INJECTION
Status: DISPENSED
Start: 2018-11-05

## (undated) RX ORDER — GABAPENTIN 300 MG/1
CAPSULE ORAL
Status: DISPENSED
Start: 2018-11-05

## (undated) RX ORDER — BUPIVACAINE HYDROCHLORIDE 2.5 MG/ML
INJECTION, SOLUTION EPIDURAL; INFILTRATION; INTRACAUDAL
Status: DISPENSED
Start: 2018-11-05

## (undated) RX ORDER — HYDROMORPHONE HYDROCHLORIDE 1 MG/ML
INJECTION, SOLUTION INTRAMUSCULAR; INTRAVENOUS; SUBCUTANEOUS
Status: DISPENSED
Start: 2018-11-05

## (undated) RX ORDER — ACETAMINOPHEN 500 MG
TABLET ORAL
Status: DISPENSED
Start: 2018-11-05

## (undated) RX ORDER — ONDANSETRON 2 MG/ML
INJECTION INTRAMUSCULAR; INTRAVENOUS
Status: DISPENSED
Start: 2018-11-05

## (undated) RX ORDER — GLYCOPYRROLATE 0.2 MG/ML
INJECTION, SOLUTION INTRAMUSCULAR; INTRAVENOUS
Status: DISPENSED
Start: 2018-11-05